# Patient Record
Sex: MALE | Race: WHITE | NOT HISPANIC OR LATINO | Employment: OTHER | URBAN - METROPOLITAN AREA
[De-identification: names, ages, dates, MRNs, and addresses within clinical notes are randomized per-mention and may not be internally consistent; named-entity substitution may affect disease eponyms.]

---

## 2019-07-30 ENCOUNTER — HOSPITAL ENCOUNTER (OUTPATIENT)
Dept: RADIOLOGY | Facility: HOSPITAL | Age: 79
Discharge: HOME | End: 2019-07-30
Attending: INTERNAL MEDICINE
Payer: MEDICARE

## 2019-07-30 DIAGNOSIS — M54.12 CERVICAL RADICULITIS: Primary | ICD-10-CM

## 2019-07-30 DIAGNOSIS — M54.12 CERVICAL RADICULITIS: ICD-10-CM

## 2019-07-30 PROCEDURE — 72050 XR CERVICAL SPINE COMPLETE 5 VIEW: ICD-10-PCS | Mod: 26,,, | Performed by: RADIOLOGY

## 2019-07-30 PROCEDURE — 72050 X-RAY EXAM NECK SPINE 4/5VWS: CPT | Mod: 26,,, | Performed by: RADIOLOGY

## 2019-07-30 PROCEDURE — 72050 X-RAY EXAM NECK SPINE 4/5VWS: CPT | Mod: TC,FY

## 2019-09-04 DIAGNOSIS — M25.559 HIP PAIN: Primary | ICD-10-CM

## 2019-09-05 ENCOUNTER — HOSPITAL ENCOUNTER (OUTPATIENT)
Dept: RADIOLOGY | Facility: HOSPITAL | Age: 79
Discharge: HOME | End: 2019-09-05
Attending: PAIN MEDICINE
Payer: MEDICARE

## 2019-09-05 DIAGNOSIS — M25.559 HIP PAIN: ICD-10-CM

## 2019-09-05 PROCEDURE — 73521 X-RAY EXAM HIPS BI 2 VIEWS: CPT | Mod: 26,,, | Performed by: RADIOLOGY

## 2019-09-05 PROCEDURE — 73521 X-RAY EXAM HIPS BI 2 VIEWS: CPT | Mod: TC,FY

## 2019-09-05 PROCEDURE — 73521 XR HIPS BILATERAL 2 VIEW INCL AP PELVIS: ICD-10-PCS | Mod: 26,,, | Performed by: RADIOLOGY

## 2019-09-19 DIAGNOSIS — M54.12 CERVICAL RADICULOPATHY: Primary | ICD-10-CM

## 2019-10-14 ENCOUNTER — CLINICAL SUPPORT (OUTPATIENT)
Dept: REHABILITATION | Facility: HOSPITAL | Age: 79
End: 2019-10-14
Attending: INTERNAL MEDICINE
Payer: MEDICARE

## 2019-10-14 DIAGNOSIS — M54.12 CERVICAL RADICULOPATHY: ICD-10-CM

## 2019-10-14 DIAGNOSIS — M54.2 CERVICAL PAIN: ICD-10-CM

## 2019-10-14 DIAGNOSIS — R53.1 WEAKNESS: ICD-10-CM

## 2019-10-14 PROCEDURE — 97140 MANUAL THERAPY 1/> REGIONS: CPT | Mod: PN

## 2019-10-14 PROCEDURE — 97162 PT EVAL MOD COMPLEX 30 MIN: CPT | Mod: PN

## 2019-10-14 NOTE — PLAN OF CARE
[facility]  Physical Therapy Initial Evaluation    Name: Cesar Eubanks  Clinic Number: [id number]    Therapy Diagnosis:   Encounter Diagnoses   Name Primary?    Cervical pain     Weakness     Cervical radiculopathy      Physician: Alfa Chase MD    Physician Orders: PT Eval and Treat   Medical Diagnosis: M54.12 (ICD-10-CM) - Cervical radiculopathy  Date of Surgery: NA    Evaluation Date: 10/14/2019  Authorization Period Expiration: 11/11/2019  Plan of Care Certification Period: 11/30/2019  Visit # / Visits authorized: 1/ 12    Time In: 1000  Time Out: 1045  Total Billable Time: 45 minutes    Precautions: HTN    Subjective   Date of onset: 6 months  History of current condition - Cesar reports to PT with RUE pins/needles and itching. Notes pain/discomfort began approximately 6 months prior without any specific BELKIS. No prior hx of these symptoms. X ray shows anterolisthesis at C4-C5 & C5-C6 levels. Pain in the neck is minimal at this time.       Past Medical History:   Diagnosis Date    Arthritis     Hernia 6/2013    Hypertension     Polymyalgia rheumatica      Cesar Eubanks  has a past surgical history that includes Colonoscopy; Pilonidal cyst drainage; laparoscopic cholecystectomy (10/17/12); Cholecystectomy (10/17/12); Appendectomy; Tonsillectomy; and Hernia repair (07/11/2013).    Cesar has a current medication list which includes the following prescription(s): aspirin, cetirizine, diclofenac, lisinopril-hydrochlorothiazide, nifedical xl, tramadol, and zolpidem.    Review of patient's allergies indicates:   Allergen Reactions    Penicillins         Imaging, X Ray: No evidence for acute fracture, dislocation or destructive process.  There is grade 1 anterolisthesis of C4 on C5 and of C5 on C6.  Vertebral body heights are maintained.  There is diffuse disc space narrowing.  The facet joints demonstrate degenerative changes but are well aligned.  There is  uncovertebral spurring bilaterally.  The dense and atlanto odontoid interval are maintained.  No prevertebral soft tissue swelling.  Skullbase, soft tissues of the neck, lung apices are unremarkable.    Prior Therapy: Hips and Knees  Social History: Cares for spouse with dementia/Alzheimers   Occupation: Retired  Prior Level of Function: Independent  Current Level of Function: Constant itching and difficulty with repeated activity    Pain:  Current 1/10, worst 5/10, best 0/10   Location: right back , elbow  and shoulder   Description: Aching, Tingling and Itching  Aggravating Factors: Laying and Lifting  Easing Factors: CBD Oil    Pts goals: Decrease discomfort with daily activity    Objective     Observation: Pt enters independently    Posture: Rounded shoulder/Forward Head    Cervical Range of Motion:    Degrees Pain   Flexion WNL      Extension 50 -     Right Rotation 40      Left Rotation 40      Right Side Bending 30    Left Side Bending 25 +      Shoulder Range of Motion:   Shoulder Left Right   Flexion 180 170   Abduction 180 160   ER 90 75   IR T12 L4       Upper Extremity Strength  (R) UE  (L) UE    Shoulder flexion: 4/5 Shoulder flexion: 5/5   Shoulder Abduction: 4/5 Shoulder abduction: 5/5   Shoulder ER 4+/5 Shoulder ER 5/5   Shoulder IR 4+/5 Shoulder IR 5/5   Elbow flexion: 4-/5 Elbow flexion: 5/5   Elbow extension: 4-/5 Elbow extension: 5/5   Wrist flexion: 5/5 Wrist flexion: 5/5   Wrist extension: 5/5 Wrist extension: 5/5         Special Tests:  Distraction NC   Compression NC   Ulnar Stress Pos   VA test Neg   Lateral Flexion Alar Ligament Neg   DNF test Neg       TOS tests:   - Reproduction of concordant signs with palpation of anterior/middle scalenes, 1st rib, and pectoralis minor msculature    Palpation: As noted above      Sensation: Impaired along C5 distribution          CMS Impairment/Limitation/Restriction for FOTO Cervical Survey    Therapist reviewed FOTO scores for Cesar Eubanks  on 10/14/2019.   FOTO documents entered into [employer] - see Media section.    Limitation Score: 41%  Category: Body Position    Current : CK = at least 40% but < 60% impaired, limited or restricted  Goal: CJ = at least 20% but < 40% impaired, limited or restricted           TREATMENT   Treatment Time In: 1030  Treatment Time Out: 1045  Total Treatment time separate from Evaluation time:15    Cesar received therapeutic exercises to develop strength, ROM and posture for 5 minutes including:  - HEP Review  - Pec stretch  - Ulnar nerve glide  - Cervical retraction  - Scalene release      Cesar received the following manual therapy techniques: Joint mobilizations, Manual traction, Myofacial release and Soft tissue Mobilization were applied to the: Neck/Shoulder for 10 minutes, including:  - Scalene Release (Strain/Counterstrain)  - Pec Minor release/stretch  - Ulnar nerve glides      Home Exercises and Patient Education Provided    Education provided re: HEP, Dx, POC    Written Home Exercises Provided: .  Exercises were reviewed and Cesar was able to demonstrate them prior to the end of the session.   Pt received a written copy of exercises to perform at home. Cesar demonstrated good  understanding of the education provided.     See EMR under patient instructions for exercises given.   Assessment   Cesar is a 79 y.o. male referred to outpatient Physical Therapy with a medical diagnosis of cervical radiculopathy with imaging showing an anterolisthesis at C4/5 and C5/6. Pt presents with s/sx consistent with neural compression located along 2 points commonly associated with TOS. Primary impairments at this time include strength, ROM, joint mobility, neural glide restrictions, and pain that limits pt tolerance to ADL's. This pt had a positive response after the first tx session noting a resolution of pain and abnormal sensation post scalene and pec minor releases. Good prognosis moving forward with PT to focus on postural  education, pt change of sleeping habits, and soft tissue release for neural decompression.    Pt prognosis is Good.   Pt will benefit from skilled outpatient Physical Therapy to address the deficits stated above and in the chart below, provide pt/family education, and to maximize pt's level of independence.     Plan of care discussed with patient: Yes  Pt's spiritual, cultural and educational needs considered and patient is agreeable to the plan of care and goals as stated below:     Anticipated Barriers for therapy: Age    Medical Necessity is demonstrated by the following  History  Co-morbidities and personal factors that may impact the plan of care Co-morbidities:   See Above    Personal Factors:   age     moderate   Examination  Body Structures and Functions, activity limitations and participation restrictions that may impact the plan of care Body Regions:   neck  upper extremities    Body Systems:    gross symmetry  ROM  strength    Participation Restrictions:   Lifting, Carrying, Pushing, Pulling, Turning head while driving    Activity limitations:   Learning and applying knowledge  no deficits    Mobility  lifting and carrying objects    Self care  dressing    Domestic Life  doing house work (cleaning house, washing dishes, laundry)    Life Areas  no deficits    Community and Social Life  no deficits         moderate   Clinical Presentation evolving clinical presentation with changing clinical characteristics moderate   Decision Making/ Complexity Score: moderate     Goals:    Short Term Goals (4 Weeks):  - Pt will increase cervical ROM to >50% of WNL in all planes for improved tolerance with driving activity  - Pt will increase RUE strength to > 4/5 for ease with ADL's  - Decrease pain/sensation symptoms by 50% for improved tolerance to ADL's    - Pt independent with HEP to improve tolerance to exercise progressions.     Long Term Goals (6 Weeks):  - Pt will increase cervical ROM to 75% of WNL and RUE to  match the contralateral side for return to prior activity level  - Pt will increase RUE strength to match the contralateral side  - Decrease symptoms >75% for return to prior activity level   - Pt will report improvement in overall functional abilities, evidenced by improved score on  FOTO to 40% limitation or better.         Plan   Certification Period/Plan of care expiration: 10/14/2019 to 11/30/2019.    Outpatient Physical Therapy 2 times weekly for 8 weeks to include the following interventions: Manual Therapy, Moist Heat/ Ice, Neuromuscular Re-ed, Patient Education, Therapeutic Activites and Therapeutic Exercise.     Jaxon Lockett, PT, DPT, OCS

## 2019-10-17 ENCOUNTER — CLINICAL SUPPORT (OUTPATIENT)
Dept: REHABILITATION | Facility: HOSPITAL | Age: 79
End: 2019-10-17
Attending: INTERNAL MEDICINE
Payer: MEDICARE

## 2019-10-17 DIAGNOSIS — M54.2 CERVICAL PAIN: ICD-10-CM

## 2019-10-17 DIAGNOSIS — R53.1 WEAKNESS: ICD-10-CM

## 2019-10-17 DIAGNOSIS — M54.12 CERVICAL RADICULOPATHY: ICD-10-CM

## 2019-10-17 PROCEDURE — 97140 MANUAL THERAPY 1/> REGIONS: CPT | Mod: PN

## 2019-10-17 PROCEDURE — 97110 THERAPEUTIC EXERCISES: CPT | Mod: PN

## 2019-10-17 NOTE — PROGRESS NOTES
"  Physical Therapy Daily Treatment Note     Name: Cesar Eubanks  Clinic Number: [id number]    Therapy Diagnosis:   Encounter Diagnoses   Name Primary?    Cervical pain     Weakness     Cervical radiculopathy      Physician: Alfa Chase MD    Visit Date: 10/17/2019  Physician Orders: PT Eval and Treat   Medical Diagnosis: M54.12 (ICD-10-CM) - Cervical radiculopathy  Date of Surgery: NA     Evaluation Date: 10/14/2019  Authorization Period Expiration: 11/11/2019  Plan of Care Certification Period: 11/30/2019  Visit # / Visits authorized: 2/ 12       Time In: 1700  Time Out: 1755  Total Billable Time: 55 minutes    Precautions: HTN    Subjective     Pt reports: no new c/o  He was compliant with home exercise program.  Response to previous treatment: tolerated well  Functional change: Ongoing    Pain: 0/10  Location: left UE and neck. "Pins and needles      Objective     Cesar received therapeutic exercises to develop strength, endurance, ROM, flexibility, posture and core stabilization for 30 minutes including:    Supine:   - Serratus press up  2x10  - Pec stretch  - Ulnar nerve glide  - Cervical retraction  - Scalene release       Cesar received the following manual therapy techniques: Joint mobilizations, Myofacial release, Soft tissue Mobilization and Friction Massage were applied to the: right upper quadrant  For 25  minutes, including:    - Scalene Release (Strain/Counterstrain)  - Pec Minor release/stretch  - Ulnar nerve glides   - Pec Insertion CFM  - Upper trap static DT in side lying  - Scalene anchoring w/AROM rotation       Home Exercises Provided and Patient Education Provided     Education provided:   - HEP stretch and nerve glide technique    Written Home Exercises Provided: Patient instructed to cont prior HEP.  Exercises were reviewed and Cesar was able to demonstrate them prior to the end of the session.  Cesar demonstrated good  understanding of the education provided.     See EMR " under Patient Instructions for exercises provided prior visit.    Assessment   Tolerated first follow up well. Reviewed HEP. Added interventions in bold    Cesar is progressing well towards his goals.   Pt prognosis is Good.     Pt will continue to benefit from skilled outpatient physical therapy to address the deficits listed in the problem list box on initial evaluation, provide pt/family education and to maximize pt's level of independence in the home and community environment.     Pt's spiritual, cultural and educational needs considered and pt agreeable to plan of care and goals.     Anticipated barriers to physical therapy:  age    Goals:   Short Term Goals (4 Weeks):  - Pt will increase cervical ROM to >50% of WNL in all planes for improved tolerance with driving activity  - Pt will increase RUE strength to > 4/5 for ease with ADL's  - Decrease pain/sensation symptoms by 50% for improved tolerance to ADL's    - Pt independent with HEP to improve tolerance to exercise progressions.      Long Term Goals (6 Weeks):  - Pt will increase cervical ROM to 75% of WNL and RUE to match the contralateral side for return to prior activity level  - Pt will increase RUE strength to match the contralateral side  - Decrease symptoms >75% for return to prior activity level   - Pt will report improvement in overall functional abilities, evidenced by improved score on  FOTO to 40% limitation or better.           Plan     Continue PT POC     Gregorio Herrmann, ROSAS

## 2019-10-22 ENCOUNTER — CLINICAL SUPPORT (OUTPATIENT)
Dept: REHABILITATION | Facility: HOSPITAL | Age: 79
End: 2019-10-22
Attending: INTERNAL MEDICINE
Payer: MEDICARE

## 2019-10-22 DIAGNOSIS — M54.2 CERVICAL PAIN: ICD-10-CM

## 2019-10-22 DIAGNOSIS — R53.1 WEAKNESS: ICD-10-CM

## 2019-10-22 DIAGNOSIS — M54.12 CERVICAL RADICULOPATHY: ICD-10-CM

## 2019-10-22 PROCEDURE — 97110 THERAPEUTIC EXERCISES: CPT | Mod: PN

## 2019-10-22 PROCEDURE — 97140 MANUAL THERAPY 1/> REGIONS: CPT | Mod: PN

## 2019-10-22 NOTE — PROGRESS NOTES
"  Physical Therapy Daily Treatment Note     Name: Cesar Eubanks  Clinic Number: [id number]    Therapy Diagnosis:   Encounter Diagnoses   Name Primary?    Cervical pain     Weakness     Cervical radiculopathy      Physician: Alfa Chase MD    Visit Date: 10/22/2019  Physician Orders: PT Eval and Treat   Medical Diagnosis: M54.12 (ICD-10-CM) - Cervical radiculopathy  Date of Surgery: NA     Evaluation Date: 10/14/2019  Authorization Period Expiration: 11/11/2019  Plan of Care Certification Period: 11/30/2019  Visit # / Visits authorized: 3/ 12       Time In: 1700  Time Out: 1745  Total Billable Time: 25 minutes    Precautions: HTN    Subjective     Pt reports: still has intermittent episodes of "pins and needles" down right shoulder and arm.  None right now.  He was compliant with home exercise program.  Response to previous treatment: tolerated well  Functional change: Ongoing    Pain: 0/10  Location: left UE and neck. "Pins and needles"     Objective     Cesar received therapeutic exercises to develop strength, endurance, ROM, flexibility, posture and core stabilization for 25 minutes including:    Supine:   - Serratus press up  2x10  - Pec stretch  - Ulnar nerve glide  - Cervical retraction  - Scalene stretch  - Open book 1x10 R only       Cesar received the following manual therapy techniques: Joint mobilizations, Myofacial release, Soft tissue Mobilization and Friction Massage were applied to the: right upper quadrant for 15  minutes, including:    - Scalene Release   - Pec Minor release/stretch  - Ulnar nerve glides   - Pec Insertion CFM  - Upper trap static STM/MFR in side lying  - Scalene anchoring w/AROM rotation       Home Exercises Provided and Patient Education Provided     Education provided:   - HEP stretch and nerve glide technique    Written Home Exercises Provided: Patient instructed to cont prior HEP.  Exercises were reviewed and Cesar was able to demonstrate them prior to the end of " "the session.  Cesar demonstrated good  understanding of the education provided.     See EMR under Patient Instructions for exercises provided prior visit.    Assessment     Patient able to complete all therex with reports of "pins and needles" only with scalene stretch.  Symptoms resolved immediately when stopped.  States "good" after treatment.  No complaints of pain or radicular symptoms.    Cesar is progressing well towards his goals.   Pt prognosis is Good.     Pt will continue to benefit from skilled outpatient physical therapy to address the deficits listed in the problem list box on initial evaluation, provide pt/family education and to maximize pt's level of independence in the home and community environment.     Pt's spiritual, cultural and educational needs considered and pt agreeable to plan of care and goals.     Anticipated barriers to physical therapy:  age    Goals:   Short Term Goals (4 Weeks):  - Pt will increase cervical ROM to >50% of WNL in all planes for improved tolerance with driving activity  - Pt will increase RUE strength to > 4/5 for ease with ADL's  - Decrease pain/sensation symptoms by 50% for improved tolerance to ADL's    - Pt independent with HEP to improve tolerance to exercise progressions.      Long Term Goals (6 Weeks):  - Pt will increase cervical ROM to 75% of WNL and RUE to match the contralateral side for return to prior activity level  - Pt will increase RUE strength to match the contralateral side  - Decrease symptoms >75% for return to prior activity level   - Pt will report improvement in overall functional abilities, evidenced by improved score on  FOTO to 40% limitation or better.           Plan     Continue PT POC     Sandee Curry PTA   "

## 2019-10-29 ENCOUNTER — CLINICAL SUPPORT (OUTPATIENT)
Dept: REHABILITATION | Facility: HOSPITAL | Age: 79
End: 2019-10-29
Attending: INTERNAL MEDICINE
Payer: MEDICARE

## 2019-10-29 DIAGNOSIS — M54.2 CERVICAL PAIN: ICD-10-CM

## 2019-10-29 DIAGNOSIS — M54.12 CERVICAL RADICULOPATHY: ICD-10-CM

## 2019-10-29 DIAGNOSIS — R53.1 WEAKNESS: ICD-10-CM

## 2019-10-29 PROCEDURE — 97110 THERAPEUTIC EXERCISES: CPT | Mod: PN

## 2019-10-29 PROCEDURE — 97140 MANUAL THERAPY 1/> REGIONS: CPT | Mod: PN

## 2019-10-29 NOTE — PROGRESS NOTES
"  Physical Therapy Daily Treatment Note     Name: Cesar Eubanks  Clinic Number: [id number]    Therapy Diagnosis:   Encounter Diagnoses   Name Primary?    Cervical pain     Weakness     Cervical radiculopathy      Physician: Alfa Chase MD    Visit Date: 10/29/2019  Physician Orders: PT Eval and Treat   Medical Diagnosis: M54.12 (ICD-10-CM) - Cervical radiculopathy  Date of Surgery: NA     Evaluation Date: 10/14/2019  Authorization Period Expiration: 11/11/2019  Plan of Care Certification Period: 11/30/2019  Visit # / Visits authorized: 4/ 12    Visit amt:      57.78  Total Amt.: 421.69       Time In: 1700  Time Out: 1745  Total Billable Time: 25 minutes    Precautions: HTN    Subjective     Pt reports: still has intermittent episodes of "pins and needles" down right shoulder and arm.  None from last Tuesday until Saturday.  "whatever you did last time was great."  Has had numbness in fifth digit and medial hand since then.  He was compliant with home exercise program.  Response to previous treatment: tolerated well  Functional change: Ongoing    Pain: 0/10  Location: left UE and neck. "Pins and needles"     Objective     Cesar received therapeutic exercises to develop strength, endurance, ROM, flexibility, posture and core stabilization for 25 minutes including:    Supine:   - Serratus press up  2x10 with dowel  - Pec stretch   - Ulnar nerve glide 2x10  - Cervical retraction 1x10  - Scalene stretch 20"x3 B  - Open book 1x10 R only  - Wall slides 1x10  - No money 1x10       Cesar received the following manual therapy techniques: Joint mobilizations, Myofacial release, Soft tissue Mobilization and Friction Massage were applied to the: right upper quadrant for 15  minutes, including:    - Scalene Release   - Pec Minor release/stretch  - Ulnar nerve glides   - Pec Insertion CFM  - Upper trap static STM/MFR in side lying  - Scalene anchoring w/AROM rotation       Home Exercises Provided and Patient " "Education Provided     Education provided:   - HEP stretch and nerve glide technique    Written Home Exercises Provided: Patient instructed to cont prior HEP.  Exercises were reviewed and Cesar was able to demonstrate them prior to the end of the session.  Cesar demonstrated good  understanding of the education provided.     See EMR under Patient Instructions for exercises provided prior visit.    Assessment     Patient able to complete all therex with reports of "pins and needles" only with scalene stretch.  Symptoms resolved immediately when stopped.  States "good" after treatment.  No complaints of pain or radicular symptoms.    Cesar is progressing well towards his goals.   Pt prognosis is Good.     Pt will continue to benefit from skilled outpatient physical therapy to address the deficits listed in the problem list box on initial evaluation, provide pt/family education and to maximize pt's level of independence in the home and community environment.     Pt's spiritual, cultural and educational needs considered and pt agreeable to plan of care and goals.     Anticipated barriers to physical therapy:  age    Goals:   Short Term Goals (4 Weeks):  - Pt will increase cervical ROM to >50% of WNL in all planes for improved tolerance with driving activity  - Pt will increase RUE strength to > 4/5 for ease with ADL's  - Decrease pain/sensation symptoms by 50% for improved tolerance to ADL's    - Pt independent with HEP to improve tolerance to exercise progressions.      Long Term Goals (6 Weeks):  - Pt will increase cervical ROM to 75% of WNL and RUE to match the contralateral side for return to prior activity level  - Pt will increase RUE strength to match the contralateral side  - Decrease symptoms >75% for return to prior activity level   - Pt will report improvement in overall functional abilities, evidenced by improved score on  FOTO to 40% limitation or better.           Plan     Continue PT POC     Sandee " Antoinette, PTA

## 2019-10-31 ENCOUNTER — CLINICAL SUPPORT (OUTPATIENT)
Dept: REHABILITATION | Facility: HOSPITAL | Age: 79
End: 2019-10-31
Attending: INTERNAL MEDICINE
Payer: MEDICARE

## 2019-10-31 DIAGNOSIS — M54.2 CERVICAL PAIN: ICD-10-CM

## 2019-10-31 DIAGNOSIS — M54.12 CERVICAL RADICULOPATHY: ICD-10-CM

## 2019-10-31 DIAGNOSIS — R53.1 WEAKNESS: ICD-10-CM

## 2019-10-31 PROCEDURE — 97110 THERAPEUTIC EXERCISES: CPT | Mod: PN

## 2019-10-31 PROCEDURE — 97140 MANUAL THERAPY 1/> REGIONS: CPT | Mod: PN

## 2019-10-31 NOTE — PROGRESS NOTES
"  Physical Therapy Daily Treatment Note     Name: Cesar Eubanks  Clinic Number: [id number]    Therapy Diagnosis:   Encounter Diagnoses   Name Primary?    Cervical pain     Weakness     Cervical radiculopathy      Physician: Alfa Chase MD    Visit Date: 10/31/2019  Physician Orders: PT Eval and Treat   Medical Diagnosis: M54.12 (ICD-10-CM) - Cervical radiculopathy  Date of Surgery: NA     Evaluation Date: 10/14/2019  Authorization Period Expiration: 11/11/2019  Plan of Care Certification Period: 11/30/2019  Visit # / Visits authorized: 4/ 12    Visit amt:      57.78  Total Amt.: 421.69       Time In: 1600  Time Out: 1645  Total Billable Time: 45 minutes (2TE 1MT)     Precautions: HTN    Subjective     Pt reports: still has intermittent episodes of "pins and needles" down right shoulder and arm.  No pain or soreness today. Feels good.   He was compliant with home exercise program.  Response to previous treatment: tolerated well  Functional change: Ongoing    Pain: 0/10  Location: left UE and neck. "Pins and needles"     Objective     Cesar received therapeutic exercises to develop strength, endurance, ROM, flexibility, posture and core stabilization for 30 minutes including:    Supine:   - Serratus press up  2x10 with 4# dowel  - Pec stretch   - Ulnar nerve glide 3x10  - Cervical retraction 3x10, 5"  - Scalene stretch 20"x3 B  - Open book 3x10, 3" R only - held due to pin's and needles in R shoulder   - Wall slides 3x10  - No money 3x10, 5"  -Trunk Rotation standing using RTB 2x10        Cesar received the following manual therapy techniques: Joint mobilizations, Myofacial release, Soft tissue Mobilization and Friction Massage were applied to the: right upper quadrant for 15  minutes, including:    - Scalene Release -DNP  - Pec Minor release/stretch -DNP  - Ulnar nerve glides   - Pec Insertion CFM  - Upper trap static STM/MFR in side lying-DNP  - Scalene anchoring w/AROM rotation  -Manual Traction " "       Home Exercises Provided and Patient Education Provided     Education provided:   - HEP stretch and nerve glide technique    Written Home Exercises Provided: Patient instructed to cont prior HEP.  Exercises were reviewed and Cesar was able to demonstrate them prior to the end of the session.  Cesar demonstrated good  understanding of the education provided.     See EMR under Patient Instructions for exercises provided prior visit.    Assessment     Patient able to complete all therex with reports of "pins and needles" only with scalene stretch.  Symptoms resolved immediately when stopped.  Pt able to tolerate all other exercises. Pt also had "pins and needles" for open books and exercise was held and progressed to standing trunk rotation with RTB along with 4# dowel for serratus punch ups. Pt wanted to leave a little early today for halloween to prepare for trick-or-treaters.     Cesar is progressing well towards his goals.   Pt prognosis is Good.     Pt will continue to benefit from skilled outpatient physical therapy to address the deficits listed in the problem list box on initial evaluation, provide pt/family education and to maximize pt's level of independence in the home and community environment.     Pt's spiritual, cultural and educational needs considered and pt agreeable to plan of care and goals.     Anticipated barriers to physical therapy:  age    Goals:   Short Term Goals (4 Weeks):  - Pt will increase cervical ROM to >50% of WNL in all planes for improved tolerance with driving activity  - Pt will increase RUE strength to > 4/5 for ease with ADL's  - Decrease pain/sensation symptoms by 50% for improved tolerance to ADL's    - Pt independent with HEP to improve tolerance to exercise progressions.      Long Term Goals (6 Weeks):  - Pt will increase cervical ROM to 75% of WNL and RUE to match the contralateral side for return to prior activity level  - Pt will increase RUE strength to match the " contralateral side  - Decrease symptoms >75% for return to prior activity level   - Pt will report improvement in overall functional abilities, evidenced by improved score on  FOTO to 40% limitation or better.           Plan     Pt will continue POC as tolerated. Pt will progress with standing rows.     Car Betancourt, PT

## 2019-11-05 ENCOUNTER — CLINICAL SUPPORT (OUTPATIENT)
Dept: REHABILITATION | Facility: HOSPITAL | Age: 79
End: 2019-11-05
Attending: INTERNAL MEDICINE
Payer: MEDICARE

## 2019-11-05 DIAGNOSIS — M54.2 CERVICAL PAIN: ICD-10-CM

## 2019-11-05 DIAGNOSIS — R53.1 WEAKNESS: ICD-10-CM

## 2019-11-05 DIAGNOSIS — M54.12 CERVICAL RADICULOPATHY: ICD-10-CM

## 2019-11-05 PROCEDURE — 97140 MANUAL THERAPY 1/> REGIONS: CPT | Mod: PN

## 2019-11-05 PROCEDURE — 97110 THERAPEUTIC EXERCISES: CPT | Mod: PN

## 2019-11-05 NOTE — PROGRESS NOTES
"  Physical Therapy Daily Treatment Note     Name: Cesar Eubanks  Clinic Number: [id number]    Therapy Diagnosis:   Encounter Diagnoses   Name Primary?    Cervical pain     Weakness     Cervical radiculopathy      Physician: Alfa Chase MD    Visit Date: 11/5/2019  Physician Orders: PT Eval and Treat   Medical Diagnosis: M54.12 (ICD-10-CM) - Cervical radiculopathy  Date of Surgery: NA     Evaluation Date: 10/14/2019  Authorization Period Expiration: 11/11/2019  Plan of Care Certification Period: 11/30/2019  Visit # / Visits authorized: 6/ 12  FOTO:  6/10  NEXT  GCode: 6/10  Visit amt:      88.10  Total Amt.: 509.79       Time In: 1600  Time Out: 1645  Total Billable Time: 45 minutes (2TE 1MT)     Precautions: HTN    Subjective     Pt reports: still has intermittent episodes of "pins and needles" down right shoulder and arm.  No pain or soreness today. Feels good.   He was compliant with home exercise program.  Response to previous treatment: tolerated well  Functional change: Ongoing    Pain: 1-2/10  Location: left UE and neck. "Pins and needles"     Objective     Cesar received therapeutic exercises to develop strength, endurance, ROM, flexibility, posture and core stabilization for 30 minutes including:    Supine:   - Serratus press up  2x10 with 4# dowel  - Pec stretch   - Ulnar nerve glide 3x10  - Cervical retraction 3x10, 5"  - Scalene stretch 20"x3 B  - Open book 3x10, 3" R only - held due to pin's and needles in R shoulder   - Wall slides 3x10  - No money 3x10, 5"  -Trunk Rotation standing using RTB 2x10 out of time  - Standing rows:  NEXT    Cesar received the following manual therapy techniques: Joint mobilizations, Myofacial release, Soft tissue Mobilization and Friction Massage were applied to the: right upper quadrant for 15  minutes, including:    - STM B cervical paraspinals with elongation and sub occipital release.  - Scalene Release -DNP  - Pec Minor release/stretch   - Ulnar nerve " "glides   - Pec Insertion CFM  - Upper trap static STM/MFR in side lying  - Scalene anchoring w/AROM rotation      Home Exercises Provided and Patient Education Provided     Education provided:   - HEP stretch and nerve glide technique    Written Home Exercises Provided: Patient instructed to cont prior HEP.  Exercises were reviewed and Cesar was able to demonstrate them prior to the end of the session.  Cesar demonstrated good  understanding of the education provided.     See EMR under Patient Instructions for exercises provided 10/14/2019    Assessment     Patient able to complete all therex with reports of "pins and needles" only with open books.  Symptoms resolved immediately when stopped.  Pt able to tolerate all other exercises. Pt requests to leave a little early today    Cesar is progressing well towards his goals.   Pt prognosis is Good.     Pt will continue to benefit from skilled outpatient physical therapy to address the deficits listed in the problem list box on initial evaluation, provide pt/family education and to maximize pt's level of independence in the home and community environment.     Pt's spiritual, cultural and educational needs considered and pt agreeable to plan of care and goals.     Anticipated barriers to physical therapy:  age    Goals:   Short Term Goals (4 Weeks):  - Pt will increase cervical ROM to >50% of WNL in all planes for improved tolerance with driving activity   (PROGRESSING, NOT MET)  - Pt will increase RUE strength to > 4/5 for ease with ADL's   (PROGRESSING, NOT MET)  - Decrease pain/sensation symptoms by 50% for improved tolerance to ADL's   (PROGRESSING, NOT MET)  - Pt independent with HEP to improve tolerance to exercise progressions.  (PROGRESSING, NOT MET)       Long Term Goals (6 Weeks):  - Pt will increase cervical ROM to 75% of WNL and RUE to match the contralateral side for return to prior activity level   (PROGRESSING, NOT MET)  - Pt will increase RUE strength " to match the contralateral side.   (PROGRESSING, NOT MET)  - Decrease symptoms >75% for return to prior activity level    (PROGRESSING, NOT MET)  - Pt will report improvement in overall functional abilities, evidenced by improved score on  FOTO to 40% limitation or better.  (PROGRESSING, NOT MET)      Plan     Pt will continue POC as tolerated. Pt will progress with standing rows.     Sandee Curry, PTA

## 2019-11-07 ENCOUNTER — CLINICAL SUPPORT (OUTPATIENT)
Dept: REHABILITATION | Facility: HOSPITAL | Age: 79
End: 2019-11-07
Attending: INTERNAL MEDICINE
Payer: MEDICARE

## 2019-11-07 DIAGNOSIS — M54.12 CERVICAL RADICULOPATHY: ICD-10-CM

## 2019-11-07 DIAGNOSIS — M54.2 CERVICAL PAIN: ICD-10-CM

## 2019-11-07 DIAGNOSIS — R53.1 WEAKNESS: ICD-10-CM

## 2019-11-07 PROCEDURE — 97110 THERAPEUTIC EXERCISES: CPT | Mod: PN

## 2019-11-07 NOTE — PROGRESS NOTES
"  Physical Therapy Daily Treatment Note     Name: Cesar Eubanks  Clinic Number: [id number]    Therapy Diagnosis:   Encounter Diagnoses   Name Primary?    Cervical pain     Weakness     Cervical radiculopathy      Physician: Alfa Chase MD    Visit Date: 11/7/2019  Physician Orders: PT Eval and Treat   Medical Diagnosis: M54.12 (ICD-10-CM) - Cervical radiculopathy  Date of Surgery: NA     Evaluation Date: 10/14/2019  Authorization Period Expiration: 11/11/2019  Plan of Care Certification Period: 11/30/2019  Visit # / Visits authorized: 7/ 12  FOTO:  7/10  DONE  GCode: 7/10  Visit amt:      90.96  Total Amt.: 600.75       Time In: 1600  Time Out: 1645  Total Billable Time: 45 minutes (3TE)     Precautions: HTN    Subjective     Pt reports: itching in his R forearm that has been bothering him all day.   He was compliant with home exercise program.  Response to previous treatment: tolerated well  Functional change: Ongoing    Pain: 1-2/10  Location: left UE and neck. "Pins and needles"     Objective     Cesar received therapeutic exercises to develop strength, endurance, ROM, flexibility, posture and core stabilization for 45 minutes including:     Supine:   - Serratus press up  2x10 with 4# dowel  - Pec stretch 3x30"  - Ulnar nerve glide 3x10  - Cervical retraction 3x10, 5"  - Scalene stretch 20"x3 B  - Open book 3x10, 3" R only - held due to pin's and needles in R shoulder   - Wall slides 3x10  - No money 3x10, 5" YTBh  -Trunk Rotation standing using RTB 2x10 out of time  - Standing rows:  3x10 RTB    Cesar received the following manual therapy techniques: Joint mobilizations, Myofacial release, Soft tissue Mobilization and Friction Massage were applied to the: right upper quadrant for 0  minutes, including: Did not perform today    - STM B cervical paraspinals with elongation and sub occipital release.  - Scalene Release -DNP  - Pec Minor release/stretch   - Ulnar nerve glides   - Pec Insertion CFM  - " "Upper trap static STM/MFR in side lying  - Scalene anchoring w/AROM rotation      Home Exercises Provided and Patient Education Provided     Education provided:   - HEP stretch and nerve glide technique    Written Home Exercises Provided: Patient instructed to cont prior HEP.  Exercises were reviewed and Cesar was able to demonstrate them prior to the end of the session.  Cesar demonstrated good  understanding of the education provided.     See EMR under Patient Instructions for exercises provided 10/14/2019    Assessment     Patient able to complete all therex without any complaint of pins and needles. Pt needed min verbal and visual demonstration/cuing for open books. Pt could not complete third set as his shoulder started to bother him during open books. Pt progressed with standing rows RTB and "no money" with YTB. Pt asked to leave 15 min early.     Cesar is progressing well towards his goals.   Pt prognosis is Good.     Pt will continue to benefit from skilled outpatient physical therapy to address the deficits listed in the problem list box on initial evaluation, provide pt/family education and to maximize pt's level of independence in the home and community environment.     Pt's spiritual, cultural and educational needs considered and pt agreeable to plan of care and goals.     Anticipated barriers to physical therapy:  age    Goals:   Short Term Goals (4 Weeks):  - Pt will increase cervical ROM to >50% of WNL in all planes for improved tolerance with driving activity   (PROGRESSING, NOT MET)  - Pt will increase RUE strength to > 4/5 for ease with ADL's   (PROGRESSING, NOT MET)  - Decrease pain/sensation symptoms by 50% for improved tolerance to ADL's   (PROGRESSING, NOT MET)  - Pt independent with HEP to improve tolerance to exercise progressions.  (PROGRESSING, NOT MET)       Long Term Goals (6 Weeks):  - Pt will increase cervical ROM to 75% of WNL and RUE to match the contralateral side for return to " prior activity level   (PROGRESSING, NOT MET)  - Pt will increase RUE strength to match the contralateral side.   (PROGRESSING, NOT MET)  - Decrease symptoms >75% for return to prior activity level    (PROGRESSING, NOT MET)  - Pt will report improvement in overall functional abilities, evidenced by improved score on  FOTO to 40% limitation or better.  (PROGRESSING, NOT MET)      Plan     Pt will continue POC as tolerated. Pt will progress with standing lat pull downs    Car Betancourt, PT

## 2019-11-12 ENCOUNTER — CLINICAL SUPPORT (OUTPATIENT)
Dept: REHABILITATION | Facility: HOSPITAL | Age: 79
End: 2019-11-12
Attending: INTERNAL MEDICINE
Payer: MEDICARE

## 2019-11-12 DIAGNOSIS — M54.12 CERVICAL RADICULOPATHY: ICD-10-CM

## 2019-11-12 DIAGNOSIS — M54.2 CERVICAL PAIN: ICD-10-CM

## 2019-11-12 DIAGNOSIS — R53.1 WEAKNESS: ICD-10-CM

## 2019-11-12 PROCEDURE — 97140 MANUAL THERAPY 1/> REGIONS: CPT | Mod: PN

## 2019-11-12 PROCEDURE — 97110 THERAPEUTIC EXERCISES: CPT | Mod: PN

## 2019-11-12 NOTE — PROGRESS NOTES
"  Physical Therapy Daily Treatment Note     Name: Cesar Eubanks  Clinic Number: [id number]    Therapy Diagnosis:   Encounter Diagnoses   Name Primary?    Cervical pain     Weakness     Cervical radiculopathy      Physician: Alfa Chase MD    Visit Date: 11/12/2019  Physician Orders: PT Eval and Treat   Medical Diagnosis: M54.12 (ICD-10-CM) - Cervical radiculopathy  Date of Surgery: NA     Evaluation Date: 10/14/2019  Authorization Period Expiration: 12/11/2019  Plan of Care Certification Period: 11/30/2019  Visit # / Visits authorized: 8/ 12  FOTO:  8/10  DONE  GCode: 8/10  Visit amt:    118.42  Total Amt.: 719.17     Time In: 1600  Time Out: 1655  Total Billable Time: 55 minutes (3TE, MT x 1)     Precautions: HTN    Subjective     Pt reports: continued itching in his R forearm that has been bothering him all day. "Pins and needles comes and goes, but this itching just jose hangs there"  He was compliant with home exercise program.  Response to previous treatment: tolerated well  Functional change: Ongoing    Pain: 1-2/10  Location: left UE and neck. "Pins and needles"     Objective     Cesar received therapeutic exercises to develop strength, endurance, ROM, flexibility, posture and core stabilization for 45 minutes including:     Supine:   - Serratus press up  2x10 with 4# dowel  - Pec stretch 3x30"  - Ulnar nerve glide 3x10  - Cervical retraction 3x10, 5"  - Scalene stretch 20"x3 B  - Open book 3x10, 3" R only   - Wall slides 3x10  - No money 3x10, 5" YTB  -Trunk Rotation standing using RTB 2x10   - Standing rows:  2x15 RTB  - Standing lat pulls:  NEXT    Cesar received the following manual therapy techniques: Joint mobilizations, Myofacial release, Soft tissue Mobilization and Friction Massage were applied to the: right upper quadrant for 10 minutes, including:     - STM B cervical paraspinals with elongation and sub occipital release.  - Scalene Release -DNP  - Pec Minor release/stretch   - " "Ulnar nerve glides   - Scalene anchoring w/AROM rotation       Home Exercises Provided and Patient Education Provided     Education provided:   - HEP stretch and nerve glide technique    Written Home Exercises Provided: Patient instructed to cont prior HEP.  Exercises were reviewed and Cesar was able to demonstrate them prior to the end of the session.  Cesar demonstrated good  understanding of the education provided.     See EMR under Patient Instructions for exercises provided 10/14/2019    Assessment     Patient able to complete all therex without any complaint of pins and needles. Pt needed min verbal and visual demonstration/cuing for open books. Pt able to increase reps as noted.  States "no pain now - itching gone too" after treatment.    Cesar is progressing well towards his goals.   Pt prognosis is Good.     Pt will continue to benefit from skilled outpatient physical therapy to address the deficits listed in the problem list box on initial evaluation, provide pt/family education and to maximize pt's level of independence in the home and community environment.     Pt's spiritual, cultural and educational needs considered and pt agreeable to plan of care and goals.     Anticipated barriers to physical therapy:  age    Goals:   Short Term Goals (4 Weeks):  - Pt will increase cervical ROM to >50% of WNL in all planes for improved tolerance with driving activity   (PROGRESSING, NOT MET)  - Pt will increase RUE strength to > 4/5 for ease with ADL's   (PROGRESSING, NOT MET)  - Decrease pain/sensation symptoms by 50% for improved tolerance to ADL's   (PROGRESSING, NOT MET)  - Pt independent with HEP to improve tolerance to exercise progressions.  (PROGRESSING, NOT MET)       Long Term Goals (6 Weeks):  - Pt will increase cervical ROM to 75% of WNL and RUE to match the contralateral side for return to prior activity level   (PROGRESSING, NOT MET)  - Pt will increase RUE strength to match the contralateral side.  "  (PROGRESSING, NOT MET)  - Decrease symptoms >75% for return to prior activity level    (PROGRESSING, NOT MET)  - Pt will report improvement in overall functional abilities, evidenced by improved score on  FOTO to 40% limitation or better.  (PROGRESSING, NOT MET)      Plan     Pt will continue POC as tolerated. Pt will progress with standing lat pull downs next visit if able.    Sandee Curry, PTA

## 2019-11-14 ENCOUNTER — CLINICAL SUPPORT (OUTPATIENT)
Dept: REHABILITATION | Facility: HOSPITAL | Age: 79
End: 2019-11-14
Attending: INTERNAL MEDICINE
Payer: MEDICARE

## 2019-11-14 DIAGNOSIS — R53.1 WEAKNESS: ICD-10-CM

## 2019-11-14 DIAGNOSIS — M54.12 CERVICAL RADICULOPATHY: ICD-10-CM

## 2019-11-14 DIAGNOSIS — M54.2 CERVICAL PAIN: ICD-10-CM

## 2019-11-14 PROCEDURE — 97140 MANUAL THERAPY 1/> REGIONS: CPT | Mod: PN

## 2019-11-14 PROCEDURE — 97110 THERAPEUTIC EXERCISES: CPT | Mod: PN

## 2019-11-14 NOTE — PROGRESS NOTES
"  Physical Therapy Daily Treatment Note     Name: Cesar Kc  Clinic Number: [id number]    Therapy Diagnosis:   Encounter Diagnoses   Name Primary?    Cervical pain     Weakness     Cervical radiculopathy      Physician: Alfa Chase MD    Visit Date: 11/14/2019  Physician Orders: PT Eval and Treat   Medical Diagnosis: M54.12 (ICD-10-CM) - Cervical radiculopathy  Date of Surgery: NA     Evaluation Date: 10/14/2019  Authorization Period Expiration: 12/11/2019  Plan of Care Certification Period: 11/30/2019  Visit # / Visits authorized: 9/ 12  FOTO:  9/10  DONE  GCode: 9/10  Visit amt:    118.42  Total Amt.: 719.17     Time In: 1600  Time Out: 1655  Total Billable Time: 55 minutes (3TE, MT x 1)     Precautions: HTN    Subjective     Pt reports: "Itching and Pins and needles comes and goes, but seems to be less often and having less issues at night"  He was compliant with home exercise program.  Response to previous treatment: tolerated well  Functional change: Less issues with itching, pins and needles.    Pain: 1-2/10  Location: left UE and neck.     Objective     Cesar received therapeutic exercises to develop strength, endurance, ROM, flexibility, posture and core stabilization for 45 minutes including:     Supine:   - Serratus press up  2x10 with 4# dowel  - Pec stretch 3x30"  - Ulnar nerve glide 3x10  - Cervical retraction 3x10, 5" supine  - Scalene stretch 30"x3 B   - Open book 3x10, 3" R only   - Cervical retraction seated:  5" hold, 2x10  - Wall slides 3x10  - No money 2x10, 5" RTB  -Trunk Rotation standing using RTB 2x10   - Standing rows:  2x15 RTB  - Standing lat pulls:  3x10 RTB    Cesar received the following manual therapy techniques: Joint mobilizations, Myofacial release, Soft tissue Mobilization and Friction Massage were applied to the: right upper quadrant for 10 minutes, including:     - STM B cervical paraspinals with elongation and sub occipital release.  - Scalene Release -DNP  - " "Pec Minor release/stretch   - Ulnar nerve glides   - Scalene anchoring w/AROM rotation       Home Exercises Provided and Patient Education Provided     Education provided:    - HEP stretch and nerve glide technique  - Given RTB and instructed in use for HEP    Written Home Exercises Provided: Patient instructed to cont prior HEP.  Exercises were reviewed and Cesar was able to demonstrate them prior to the end of the session.  Cesar demonstrated good  understanding of the education provided.     See EMR under Patient Instructions for exercises provided 10/14/2019    Assessment     Patient able to complete all therex including added activity and resistance as noted without any complaint of pins and needles or itching today. Pt able to increase reps as noted.  States "no pain now after treatment.    Cesar is progressing well towards his goals.   Pt prognosis is Good.     Pt will continue to benefit from skilled outpatient physical therapy to address the deficits listed in the problem list box on initial evaluation, provide pt/family education and to maximize pt's level of independence in the home and community environment.     Pt's spiritual, cultural and educational needs considered and pt agreeable to plan of care and goals.     Anticipated barriers to physical therapy:  age    Goals:   Short Term Goals (4 Weeks):  - Pt will increase cervical ROM to >50% of WNL in all planes for improved tolerance with driving activity   (PROGRESSING, NOT MET)  - Pt will increase RUE strength to > 4/5 for ease with ADL's   (PROGRESSING, NOT MET)  - Decrease pain/sensation symptoms by 50% for improved tolerance to ADL's   (PROGRESSING, NOT MET)  - Pt independent with HEP to improve tolerance to exercise progressions.  (PROGRESSING, NOT MET)       Long Term Goals (6 Weeks):  - Pt will increase cervical ROM to 75% of WNL and RUE to match the contralateral side for return to prior activity level   (PROGRESSING, NOT MET)  - Pt will " increase RUE strength to match the contralateral side.   (PROGRESSING, NOT MET)  - Decrease symptoms >75% for return to prior activity level    (PROGRESSING, NOT MET)  - Pt will report improvement in overall functional abilities, evidenced by improved score on  FOTO to 40% limitation or better.  (PROGRESSING, NOT MET)      Plan     Pt will continue POC as tolerated. Begin discharge planning.    Sandee Curry, PTA

## 2019-11-19 ENCOUNTER — CLINICAL SUPPORT (OUTPATIENT)
Dept: REHABILITATION | Facility: HOSPITAL | Age: 79
End: 2019-11-19
Attending: INTERNAL MEDICINE
Payer: MEDICARE

## 2019-11-19 DIAGNOSIS — R53.1 WEAKNESS: ICD-10-CM

## 2019-11-19 DIAGNOSIS — M54.12 CERVICAL RADICULOPATHY: ICD-10-CM

## 2019-11-19 DIAGNOSIS — M54.2 CERVICAL PAIN: ICD-10-CM

## 2019-11-19 PROCEDURE — 97110 THERAPEUTIC EXERCISES: CPT | Mod: PN

## 2019-11-19 PROCEDURE — 97140 MANUAL THERAPY 1/> REGIONS: CPT | Mod: PN

## 2019-11-19 NOTE — PROGRESS NOTES
"  Physical Therapy Daily Treatment Note     Name: Cesar Eubanks  Clinic Number: [id number]    Therapy Diagnosis:   Encounter Diagnoses   Name Primary?    Cervical pain     Weakness     Cervical radiculopathy      Physician: Alfa Chase MD    Visit Date: 11/19/2019  Physician Orders: PT Eval and Treat   Medical Diagnosis: M54.12 (ICD-10-CM) - Cervical radiculopathy  Date of Surgery: NA     Evaluation Date: 10/14/2019  Authorization Period Expiration: 12/11/2019  Plan of Care Certification Period: 11/30/2019  Visit # / Visits authorized: 10/ 12  FOTO:  10/10    GCode: 10/10  Visit amt:    118.42  Total Amt.: 837.59     Time In: 1600  Time Out: 1655  Total Billable Time: 55 minutes (3TE, MT x 1)     Precautions: HTN    Subjective     Pt reports: "Itching and Pins and needles comes and goes, but seems to be less often and having less issues at night"  He was compliant with home exercise program.  Response to previous treatment: tolerated well  Functional change: Less issues with itching, pins and needles.    Pain: 1-2/10  Location: left UE and neck.     Objective     Cesar received therapeutic exercises to develop strength, endurance, ROM, flexibility, posture and core stabilization for 45 minutes including:     Supine:   - Serratus press up  2x10 with 4# dowel  - Pec stretch 3x30"  - Ulnar nerve glide 3x10  - Cervical retraction 3x10, 5" supine  - Scalene stretch 30"x3 B   - Open book 3x10, 3" R only   - Cervical retraction seated:  5" hold, 2x10  - Wall slides 3x10  - No money 2x12, 5" RTB  -Trunk Rotation standing using RTB 2x12   - Standing rows:  2x15 RTB  - Standing lat pulls:  2x15 RTB    Cesar received the following manual therapy techniques: Joint mobilizations, Myofacial release, Soft tissue Mobilization and Friction Massage were applied to the: right upper quadrant for 10 minutes, including:     - STM B cervical paraspinals with elongation and sub occipital release.  - Pec Minor " "release/stretch   - Ulnar nerve glides   - Scalene anchoring w/AROM rotation       Home Exercises Provided and Patient Education Provided     Education provided:    - HEP stretch and nerve glide technique  - Given RTB and instructed in use for HEP    Written Home Exercises Provided: Patient instructed to cont prior HEP.  Exercises were reviewed and Cesar was able to demonstrate them prior to the end of the session.  Cesar demonstrated good  understanding of the education provided.     See EMR under Patient Instructions for exercises provided 10/14/2019    Assessment     Patient able to complete all therex today without any complaint of pins and needles or itching during treatment. Pt able to increase reps as noted.  States "no pain now" after treatment.    Cesar is progressing well towards his goals.   Pt prognosis is Good.     Pt will continue to benefit from skilled outpatient physical therapy to address the deficits listed in the problem list box on initial evaluation, provide pt/family education and to maximize pt's level of independence in the home and community environment.     Pt's spiritual, cultural and educational needs considered and pt agreeable to plan of care and goals.     Anticipated barriers to physical therapy:  age    Goals:   Short Term Goals (4 Weeks):  - Pt will increase cervical ROM to >50% of WNL in all planes for improved tolerance with driving activity   (PROGRESSING, NOT MET)  - Pt will increase RUE strength to > 4/5 for ease with ADL's   (PROGRESSING, NOT MET)  - Decrease pain/sensation symptoms by 50% for improved tolerance to ADL's   (PROGRESSING, NOT MET)  - Pt independent with HEP to improve tolerance to exercise progressions.  (PROGRESSING, NOT MET)       Long Term Goals (6 Weeks):  - Pt will increase cervical ROM to 75% of WNL and RUE to match the contralateral side for return to prior activity level   (PROGRESSING, NOT MET)  - Pt will increase RUE strength to match the " contralateral side.   (PROGRESSING, NOT MET)  - Decrease symptoms >75% for return to prior activity level    (PROGRESSING, NOT MET)  - Pt will report improvement in overall functional abilities, evidenced by improved score on  FOTO to 40% limitation or better.  (PROGRESSING, NOT MET)      Plan     Pt will continue POC as tolerated.  Prepare for possible discharge next week.    Sandee Curry, PTA

## 2019-11-21 ENCOUNTER — CLINICAL SUPPORT (OUTPATIENT)
Dept: REHABILITATION | Facility: HOSPITAL | Age: 79
End: 2019-11-21
Attending: INTERNAL MEDICINE
Payer: MEDICARE

## 2019-11-21 DIAGNOSIS — M54.12 CERVICAL RADICULOPATHY: ICD-10-CM

## 2019-11-21 DIAGNOSIS — M54.2 CERVICAL PAIN: ICD-10-CM

## 2019-11-21 DIAGNOSIS — R53.1 WEAKNESS: ICD-10-CM

## 2019-11-21 PROCEDURE — G8979 MOBILITY GOAL STATUS: HCPCS | Mod: CK,PN

## 2019-11-21 PROCEDURE — G8980 MOBILITY D/C STATUS: HCPCS | Mod: CJ,PN

## 2019-11-21 PROCEDURE — 97110 THERAPEUTIC EXERCISES: CPT | Mod: PN

## 2020-04-20 PROBLEM — R53.1 WEAKNESS: Status: RESOLVED | Noted: 2019-10-14 | Resolved: 2020-04-20

## 2020-04-20 PROBLEM — M54.2 CERVICAL PAIN: Status: RESOLVED | Noted: 2019-10-14 | Resolved: 2020-04-20

## 2020-04-20 PROBLEM — M54.12 CERVICAL RADICULOPATHY: Status: RESOLVED | Noted: 2019-10-14 | Resolved: 2020-04-20

## 2020-09-04 PROBLEM — M48.062 SPINAL STENOSIS OF LUMBAR REGION WITH NEUROGENIC CLAUDICATION: Status: ACTIVE | Noted: 2018-06-12

## 2020-09-04 PROBLEM — H25.9 AGE-RELATED CATARACT: Status: ACTIVE | Noted: 2019-07-17

## 2020-09-04 PROBLEM — B35.1 ONYCHOMYCOSIS: Status: ACTIVE | Noted: 2018-03-01

## 2020-09-04 PROBLEM — M10.9 GOUT: Status: ACTIVE | Noted: 2020-09-04

## 2020-09-04 PROBLEM — M47.816 ARTHROPATHY OF LUMBAR FACET JOINT: Status: ACTIVE | Noted: 2020-09-04

## 2020-09-04 PROBLEM — R29.818 NEUROGENIC CLAUDICATION: Status: ACTIVE | Noted: 2020-09-04

## 2020-09-04 PROBLEM — J30.9 ALLERGIC RHINITIS: Status: ACTIVE | Noted: 2019-04-04

## 2020-09-04 PROBLEM — E66.9 OBESITY: Status: ACTIVE | Noted: 2020-09-04

## 2020-09-04 PROBLEM — M25.519 SHOULDER PAIN: Status: ACTIVE | Noted: 2020-09-04

## 2020-09-04 PROBLEM — M50.30 DEGENERATION OF INTERVERTEBRAL DISC OF CERVICAL REGION: Status: ACTIVE | Noted: 2020-09-04

## 2020-09-22 PROBLEM — G89.29 CHRONIC PAIN OF BOTH SHOULDERS: Status: ACTIVE | Noted: 2020-09-04

## 2020-09-22 PROBLEM — M25.511 CHRONIC PAIN OF BOTH SHOULDERS: Status: ACTIVE | Noted: 2020-09-04

## 2020-09-22 PROBLEM — M25.512 CHRONIC PAIN OF BOTH SHOULDERS: Status: ACTIVE | Noted: 2020-09-04

## 2020-10-14 ENCOUNTER — TELEPHONE (OUTPATIENT)
Dept: ORTHOPEDICS | Facility: CLINIC | Age: 80
End: 2020-10-14

## 2020-10-14 DIAGNOSIS — M25.519 SHOULDER PAIN, UNSPECIFIED CHRONICITY, UNSPECIFIED LATERALITY: Primary | ICD-10-CM

## 2020-10-15 ENCOUNTER — OFFICE VISIT (OUTPATIENT)
Dept: ORTHOPEDICS | Facility: CLINIC | Age: 80
End: 2020-10-15
Payer: MEDICARE

## 2020-10-15 ENCOUNTER — HOSPITAL ENCOUNTER (OUTPATIENT)
Dept: RADIOLOGY | Facility: HOSPITAL | Age: 80
Discharge: HOME | End: 2020-10-15
Attending: ORTHOPAEDIC SURGERY
Payer: MEDICARE

## 2020-10-15 DIAGNOSIS — G89.29 CHRONIC PAIN OF BOTH SHOULDERS: ICD-10-CM

## 2020-10-15 DIAGNOSIS — M25.519 SHOULDER PAIN, UNSPECIFIED CHRONICITY, UNSPECIFIED LATERALITY: ICD-10-CM

## 2020-10-15 DIAGNOSIS — M25.511 CHRONIC PAIN OF BOTH SHOULDERS: ICD-10-CM

## 2020-10-15 DIAGNOSIS — M25.512 CHRONIC PAIN OF BOTH SHOULDERS: ICD-10-CM

## 2020-10-15 PROCEDURE — 73030 X-RAY EXAM OF SHOULDER: CPT | Mod: 26,50,, | Performed by: RADIOLOGY

## 2020-10-15 PROCEDURE — 99203 PR OFFICE/OUTPT VISIT, NEW, LEVL III, 30-44 MIN: ICD-10-PCS | Mod: 25,S$GLB,, | Performed by: ORTHOPAEDIC SURGERY

## 2020-10-15 PROCEDURE — 1125F AMNT PAIN NOTED PAIN PRSNT: CPT | Mod: S$GLB,,, | Performed by: ORTHOPAEDIC SURGERY

## 2020-10-15 PROCEDURE — 1159F MED LIST DOCD IN RCRD: CPT | Mod: S$GLB,,, | Performed by: ORTHOPAEDIC SURGERY

## 2020-10-15 PROCEDURE — 73030 XR SHOULDER COMPLETE 2 OR MORE VIEWS BILATERAL: ICD-10-PCS | Mod: 26,50,, | Performed by: RADIOLOGY

## 2020-10-15 PROCEDURE — 1101F PR PT FALLS ASSESS DOC 0-1 FALLS W/OUT INJ PAST YR: ICD-10-PCS | Mod: CPTII,S$GLB,, | Performed by: ORTHOPAEDIC SURGERY

## 2020-10-15 PROCEDURE — 99203 OFFICE O/P NEW LOW 30 MIN: CPT | Mod: 25,S$GLB,, | Performed by: ORTHOPAEDIC SURGERY

## 2020-10-15 PROCEDURE — 1159F PR MEDICATION LIST DOCUMENTED IN MEDICAL RECORD: ICD-10-PCS | Mod: S$GLB,,, | Performed by: ORTHOPAEDIC SURGERY

## 2020-10-15 PROCEDURE — 99999 PR PBB SHADOW E&M-EST. PATIENT-LVL III: ICD-10-PCS | Mod: PBBFAC,,, | Performed by: ORTHOPAEDIC SURGERY

## 2020-10-15 PROCEDURE — 20610 DRAIN/INJ JOINT/BURSA W/O US: CPT | Mod: 50,S$GLB,, | Performed by: ORTHOPAEDIC SURGERY

## 2020-10-15 PROCEDURE — 20610 PR DRAIN/INJECT LARGE JOINT/BURSA: ICD-10-PCS | Mod: 50,S$GLB,, | Performed by: ORTHOPAEDIC SURGERY

## 2020-10-15 PROCEDURE — 1101F PT FALLS ASSESS-DOCD LE1/YR: CPT | Mod: CPTII,S$GLB,, | Performed by: ORTHOPAEDIC SURGERY

## 2020-10-15 PROCEDURE — 1125F PR PAIN SEVERITY QUANTIFIED, PAIN PRESENT: ICD-10-PCS | Mod: S$GLB,,, | Performed by: ORTHOPAEDIC SURGERY

## 2020-10-15 PROCEDURE — 73030 X-RAY EXAM OF SHOULDER: CPT | Mod: TC,50,PN

## 2020-10-15 PROCEDURE — 99999 PR PBB SHADOW E&M-EST. PATIENT-LVL III: CPT | Mod: PBBFAC,,, | Performed by: ORTHOPAEDIC SURGERY

## 2020-10-15 RX ORDER — TRIAMCINOLONE ACETONIDE 40 MG/ML
40 INJECTION, SUSPENSION INTRA-ARTICULAR; INTRAMUSCULAR
Status: COMPLETED | OUTPATIENT
Start: 2020-10-15 | End: 2020-10-15

## 2020-10-15 RX ADMIN — TRIAMCINOLONE ACETONIDE 40 MG: 40 INJECTION, SUSPENSION INTRA-ARTICULAR; INTRAMUSCULAR at 10:10

## 2020-10-15 NOTE — PROGRESS NOTES
Subjective:      Patient ID: Cesar Eubanks is a 80 y.o. male.    Chief Complaint: Pain of the Right Shoulder and Pain of the Left Shoulder      HPI  Cesar Eubanks is a  80 y.o. male presenting today for bilateral shoulder pain.  There was not a history of trauma.  Onset of symptoms began several years ago  He actually had physical therapy about a year ago with some slight improvement  He does have some neck problems as well  Recently he has been having increased pain in both shoulders right worse than left  He also reports weakness and difficulty with lifting of the right arm  No numbness or tingling currently although he has had that in the past.      Review of patient's allergies indicates:   Allergen Reactions    Penicillins          Current Outpatient Medications   Medication Sig Dispense Refill    allopurinoL (ZYLOPRIM) 300 MG tablet 1 tablet      cetirizine (ZYRTEC) 5 MG chewable tablet Take 5 mg by mouth once daily.        colchicine (MITIGARE) 0.6 mg Cap Take two pills at the onset of gout attack, then 1 pill two hours later.  Then take 1 pill twice a day as needed for continued gout pain. 20 capsule 4    diclofenac (VOLTAREN) 75 MG EC tablet       fluticasone propionate (FLONASE) 50 mcg/actuation nasal spray 1 spray in each nostril      lisinopril-hydrochlorothiazide (PRINZIDE,ZESTORETIC) 20-25 mg Tab       NIFEDICAL XL 60 mg 24 hr tablet       tamsulosin (FLOMAX) 0.4 mg Cap TAKE ONE CAPSULE BY MOUTH EVERY DAY 90 capsule 3    tramadol (ULTRAM) 50 mg tablet       zolpidem (AMBIEN) 10 mg Tab TAKE 1 TABLET BY MOUTH EVERY NIGHT AT BEDTIME AS NEEDED 30 tablet 4     No current facility-administered medications for this visit.        Past Medical History:   Diagnosis Date    Arthritis     Hernia 6/2013    Hypertension     Polymyalgia rheumatica        Past Surgical History:   Procedure Laterality Date    APPENDECTOMY      CHOLECYSTECTOMY  10/17/12    COLONOSCOPY      HERNIA  REPAIR  07/11/2013    laparoscopic cholecystectomy  10/17/12    PILONIDAL CYST DRAINAGE      TONSILLECTOMY         Review of Systems:  ROS    OBJECTIVE:     PHYSICAL EXAM:       Vitals:    10/15/20 0943   PainSc:   5     Well developed, well nourished male in no acute distress  Alert and oriented x 3  HEENT- Normal exam  Lungs- Clear to auscultation  Heart- Regular rate and rhythm  Abdomen- Soft nontender  Extremity exam- examination right shoulder demonstrates no tenderness no swelling  Range of motion slightly decreased secondary to pain and stiffness  Mild crepitation  Left shoulder has better motion  There is weakness of the rotator cuff bilaterally      RADIOGRAPHS:  AP and lateral x-rays bilateral shoulders demonstrate rotator cuff arthropathy with elevation of the humeral head right shoulder worse than left    Comments: I have personally reviewed the imaging and I agree with the above radiologist's report.    ASSESSMENT/PLAN:     IMPRESSION:  Bilateral rotator cuff arthropathy right worse than left    PLAN:  I explained the nature of the problem to the patient  Recommended injections today  After pause for time-out identified each shoulder injected bilaterally with combination Kenalog 40 mg 2 cc xylocaine sterile technique  He tolerated the procedure well without complication  I have also ordered some physical therapy for strengthening both shoulders  We can incorporate the neck as well if needed  Follow up 1-2 months       - We talked at length about the anatomy and pathophysiology of   Encounter Diagnosis   Name Primary?    Chronic pain of both shoulders            Disclaimer: This note has been generated using voice-recognition software. There may be typographical errors that have been missed during proof-reading.

## 2020-11-12 ENCOUNTER — CLINICAL SUPPORT (OUTPATIENT)
Dept: REHABILITATION | Facility: HOSPITAL | Age: 80
End: 2020-11-12
Attending: ORTHOPAEDIC SURGERY
Payer: MEDICARE

## 2020-11-12 DIAGNOSIS — M62.81 MUSCLE WEAKNESS: ICD-10-CM

## 2020-11-12 DIAGNOSIS — M25.512 CHRONIC PAIN OF BOTH SHOULDERS: ICD-10-CM

## 2020-11-12 DIAGNOSIS — M89.9 SCAPULAR DYSFUNCTION: ICD-10-CM

## 2020-11-12 DIAGNOSIS — M25.511 CHRONIC PAIN OF BOTH SHOULDERS: ICD-10-CM

## 2020-11-12 DIAGNOSIS — G89.29 CHRONIC PAIN OF BOTH SHOULDERS: ICD-10-CM

## 2020-11-12 PROCEDURE — 97161 PT EVAL LOW COMPLEX 20 MIN: CPT | Mod: PN

## 2020-11-12 PROCEDURE — 97110 THERAPEUTIC EXERCISES: CPT | Mod: PN

## 2020-11-13 NOTE — PLAN OF CARE
"[facility]  Physical Therapy Initial Evaluation    Name: Cesar Eubanks  Clinic Number: [id number]    Therapy Diagnosis:   Encounter Diagnoses   Name Primary?    Chronic pain of both shoulders     Scapular dysfunction     Muscle weakness      Physician: Dmitry Taylor Jr., *    Physician Orders: PT Eval and Treat   Medical Diagnosis from Referral: M25.511,G89.29,M25.512 (ICD-10-CM) - Chronic pain of both shoulders   Evaluation Date: 11/12/2020  Authorization Period Expiration: 1/12/2020  Plan of Care Expiration: 11/12/2020 to 1/12/2021  Visit # / Visits authorized: 1/ 12  FOTO#:1/5    Time In: 12:15  Time Out: 1:00  Total Billable Time: 45 minutes (1LCE, 1TE)    Precautions: Standard    Subjective     Date of onset: 6 months    History of current condition - Cesar reports: Pt reports that he thinks he may have a torn RC on the R shoulder and also that he has L shoulder pain. Pt reports that his R shoulder has gotten worse over the last 6 months. "If I understood my doc correctly, its either you guys, or surgery." Pt was told to try PT to see if it works. Pt reports he thinks he R shoulder is weaker than the L. Putting a quart of milk on the top level of the frige is very difficulty. Pt has a lot of hobbies and states he has noticed that he cannot hold his hand gun as much as he use to be able to. Pt states has minimal pain but he thinks is mostly the strength. Pt states he has some trouble sleeping but he sleeps on his stomach with his arms overhead. Pt says he has tried other sleeping positions but has been unable to get comfortable. Pt would like to stop this from getting worse. Pt did receive 2 cortisone injections about 6 weeks ago. Hx of B carpal tunnel release.     Medical History:   Past Medical History:   Diagnosis Date    Arthritis     Hernia 6/2013    Hypertension     Polymyalgia rheumatica        Surgical History:   Cesar Eubanks  has a past surgical " "history that includes Colonoscopy; Pilonidal cyst drainage; laparoscopic cholecystectomy (10/17/12); Cholecystectomy (10/17/12); Appendectomy; Tonsillectomy; and Hernia repair (07/11/2013).    Medications:   Cesar has a current medication list which includes the following prescription(s): allopurinol, cetirizine, colchicine, diclofenac, fluticasone propionate, lisinopril-hydrochlorothiazide, nifedical xl, tamsulosin, tramadol, and zolpidem.    Allergies:   Review of patient's allergies indicates:   Allergen Reactions    Penicillins         Imaging, xray: Glenohumeral and AC joint articulations appear maintained with moderate DJD.  Periarticular spurring, more so involving the right AC joint.  No acute fracture, dislocation, or osseous destruction.    Prior Therapy: Prior PT for B TKA and for his neck.   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     Pain:  Current 0/10, worst 6/10, best 0/10   Location: bilateral shoulder   Description: "its just there, I dont know how to describe it."  Aggravating Factors: Night Time  Easing Factors: pain goes away when he moves positions.     Pts goals: Pt would like to be able to sleep at night without the possibility of being woken up and he would like to prevent things from getting worse.     Objective     Posture: Pt sits c B rounded shoulders and forward head posture. Pt demonstrates poor scapulohumeral rhythm on R  Gait: B lat sway   Palpation: Tender of R biceps tendon  Sensation: Intact  DTRs: diminished bilat  Myotomes: normal  Dermatomes: normal    Range of Motion/Strength:     CERVICAL ROM: All cervical ROM WNL     SHOULDER ROM: All shoulder ROM was WNL however pt demonstrates scapular elevation on the R during abd and " flexion. PROM has slight painful endfeel on the R.       U/E MMT Right Left Pain/Dysfunction with Movement   Shoulder Flexion 4+/5 4+/5    Shoulder Extension 5/5 5/5    Shoulder Abduction 4+/5 4+/5    Shoulder Adduction 5/5 5/5    Shoulder IR 4+/5 4+/5    Shoulder ER  @ 0* Abduction 4/5 4+/5    Shoulder ER  @ 90* Abduction 4/5 4+/5    Elbow Flexion  5/5 5/5    Elbow Extension 5/5 5/5    Rhomboids 4+/5 4+/5    Mid Traps 4+/5 4+/5    Low Traps 4-/5 4-/5        Joint Mobility:   - Thoracic: Stiffness throughout the upper thoracic spine.  - GHJ: Pt sits ant in GHJ B and has some increased motion posteriorly however laxity not present.     Flexibility: Tight pecs, tight into internal rotation    Special Tests:   - Holger Gomez  - Drop Arm  - Infraspinatus  + Lift off - pt does not have the flexibility to get arm behind back enough to perform test.       CMS Impairment/Limitation/Restriction for FOTO  Survey    Therapist reviewed FOTO scores for Cesar Ramirezgavin on 11/12/2020.   FOTO documents entered into [employer] - see Media section.    Limitation Score: 47%  Predicted Score: 34%  DASH - 35.8       TREATMENT     Treatment Time In: 12:50  Treatment Time Out:1:00PM  Total Treatment time separate from Evaluation: 10 minutes    Cesar received therapeutic exercises to develop strength, endurance, ROM, flexibility, posture and core stabilization for 10 minutes including:  Scapular retraction  B ER wall Slides (cools)  Cat Cow for thoracic mobility  B ER c RTB      NEXT VISIT:  Prone Y  Prone T  Prone Row  Pec stretch    Home Exercises and Patient Education Provided    Education provided:   - Pt educated on POC  - Pt educated on HEP  - Pt educated on anatomy and physiology of current condition as it relates to signs and symptoms     Written Home Exercises Provided: yes.  Exercises were reviewed and Cesar was able to demonstrate them prior to the end of the session.  Cesar demonstrated good  understanding of the  education provided.     See EMR under Patient Instructions for exercises provided initial evaluation.    Assessment     Cesar is a 80 y.o. male referred to outpatient Physical Therapy with a medical diagnosis of Chronic pain of both shoulders.  Pt demonstrates poor scapulohumeral rhythm on the R and crepitis on the L indicative of poor scapular stabilization and arthritis. Pt's sits c rounded shoulders and slightly forward head posture. Pt has slight weakness B but more in R compared to L. Pt may have some impingement on the R due to poor muscle coordination.     Pt will benefit from skilled outpatient Physical Therapy to address the deficits stated above and in the chart below, provide pt/family education, and to maximize pt's level of independence.   Pt prognosis is Good.       Plan of care discussed with patient: Yes  Pt's spiritual, cultural and educational needs considered and pt agreeable to plan of care and goals as stated below:     Anticipated Barriers for therapy: Chronicity of shoulder pain       Medical Necessity is demonstrated by the following  History  Co-morbidities and personal factors that may impact the plan of care Co-morbidities:   high BMI and HTN    Personal Factors:   no deficits     moderate   Examination  Body Structures and Functions, activity limitations and participation restrictions that may impact the plan of care Body Regions:   upper extremities    Body Systems:    ROM  strength  motor control    Participation Restrictions:   none    Activity limitations:   Learning and applying knowledge  no deficits    General Tasks and Commands  no deficits    Communication  no deficits    Mobility  lifting and carrying objects    Self care  no deficits    Domestic Life  no deficits    Interactions/Relationships  no deficits    Life Areas  no deficits    Community and Social Life  no deficits         moderate   Clinical Presentation stable and uncomplicated low   Decision Making/ Complexity  Score: low         Goals:  Short Term Goals (4 Weeks):   1) 1. Pt will be compliant with initial HEP to supplement PT in decreasing pain with functional mobility.  2) Pt will improve motion of the shoulder and will demonstrated decreased shoulder hike with end range shoulder abd on the R  3) Pt will improve pain to a score of 4/10 on VAS in order to do wood working   4) Pt will be able to sleep for >5hours without being woken up by pain in order to get enough rest    Long Term Goals (8 Weeks):  1) Pt will be compliant with final HEP to reduce risk of further injury after dc  2) Pt will improve FOTO to a score of 34% indicating overall improvement in function   3) Pt will improve pain to a level of 2/10 in order to do wood working  4) Pt will be able to sleep >7hours without being woken up by pain in order to get enough rest.     Plan     Plan of care Certification: 11/12/2020 to 1/12/2021    Outpatient Physical Therapy 2 times weekly for 8 weeks to include the following interventions: Electrical Stimulation PRN, Manual Therapy, Moist Heat/ Ice, Neuromuscular Re-ed, Patient Education, Therapeutic Activites and Therapeutic Exercise. All other modalities PRN. Pt will be treated in conjunction with PTA prn.     Soco Victor, PT, DPT

## 2020-11-19 ENCOUNTER — CLINICAL SUPPORT (OUTPATIENT)
Dept: REHABILITATION | Facility: HOSPITAL | Age: 80
End: 2020-11-19
Attending: ORTHOPAEDIC SURGERY
Payer: MEDICARE

## 2020-11-19 DIAGNOSIS — M62.81 MUSCLE WEAKNESS: ICD-10-CM

## 2020-11-19 DIAGNOSIS — M89.9 SCAPULAR DYSFUNCTION: ICD-10-CM

## 2020-11-19 PROCEDURE — 97110 THERAPEUTIC EXERCISES: CPT | Mod: PN,CQ

## 2020-11-19 NOTE — PROGRESS NOTES
"  Physical Therapy Treatment Note     Name: Cesar Eubanks  Clinic Number: [id number]    Therapy Diagnosis:   Encounter Diagnoses   Name Primary?    Scapular dysfunction     Muscle weakness      Physician: Dmitry Taylor Jr., *    Visit Date: 11/19/2020    Physician Orders: PT Eval and Treat   Medical Diagnosis from Referral: M25.511,G89.29,M25.512 (ICD-10-CM) - Chronic pain of both shoulders   Evaluation Date: 11/12/2020  Authorization Period Expiration: 1/12/2020  Plan of Care Expiration: 11/12/2020 to 1/12/2021  Visit # / Visits authorized: 2/ 12  FOTO#: 2/5    Time In: 1515  Time Out: 1600  Total Billable Time: 45 minutes (3TE)    Precautions: Standard    Subjective     Pt reports: that he has little pain just really wants to work on strengthening.  He was compliant with home exercise program.  Response to previous treatment: previous treatment was IE  Functional change: none noted    Pain: 3/10  Location: bilateral shoulder      Objective     Cesar received therapeutic exercises to develop strength, endurance, ROM, flexibility and posture for 45 minutes including:    Serratus punches: 2x10 with blue bar  Supine wand flexion: 2x10 with blue wand   Sidelying ER: 2x10 B  Sidelying ABD: 2x10 B  Prone Y: 2x8  Prone T: 2x8  Prone Row: 2x8  Seated thoracic extension: x15  Scapular retraction: 2x10  B ER c RTB: 2x10  Standing AAROM shoulder ABD with blue wand: 2x10 B  Serratus wall slides: 2x8  Doorway Pec stretch: 3x15"      Home Exercises Provided and Patient Education Provided     Education provided:   - Cont HEP      Written Home Exercises Provided: Patient instructed to cont prior HEP.  Exercises were reviewed and Cesar was able to demonstrate them prior to the end of the session.  Cesar demonstrated good  understanding of the education provided.     See EMR under Patient Instructions for exercises provided prior visit.    Assessment     Cesar is a 80 y.o. male referred to outpatient Physical Therapy " with a medical diagnosis of Chronic pain of both shoulders. Cesar tolerated first follow up treatment well. Patient present with weakness of B shoulders R>L. Session concentrated on improving strength and ROM of B shoulders. Patient needed frequent verbal and tactile cuing for proper form of therapeutic exercises and to avoid compensation. Patient noted a slight increase in shoulder pain with doorway pec stretch that left when patient was instructed to decrease intensity of stretch. Patient stated that he was compliant with all HEP except cat/cow exercises due to bilateral knee pain.   Cesar is progressing well towards his goals.   Pt prognosis is Good.     Pt will continue to benefit from skilled outpatient physical therapy to address the deficits listed in the problem list box on initial evaluation, provide pt/family education and to maximize pt's level of independence in the home and community environment.     Pt's spiritual, cultural and educational needs considered and pt agreeable to plan of care and goals.     Anticipated barriers to physical therapy: Chronicity of shoulder pain     Goals:  Short Term Goals (4 Weeks):   1) 1. Pt will be compliant with initial HEP to supplement PT in decreasing pain with functional mobility. PROGRESSING, NOT MET  2) Pt will improve motion of the shoulder and will demonstrated decreased shoulder hike with end range shoulder abd on the R PROGRESSING, NOT MET  3) Pt will improve pain to a score of 4/10 on VAS in order to do wood working PROGRESSING, NOT MET  4) Pt will be able to sleep for >5hours without being woken up by pain in order to get enough rest PROGRESSING, NOT MET     Long Term Goals (8 Weeks):  1) Pt will be compliant with final HEP to reduce risk of further injury after dc PROGRESSING, NOT MET  2) Pt will improve FOTO to a score of 34% indicating overall improvement in function PROGRESSING, NOT MET  3) Pt will improve pain to a level of 2/10 in order to do wood  working PROGRESSING, NOT MET  4) Pt will be able to sleep >7hours without being woken up by pain in order to get enough rest. PROGRESSING, NOT MET    Plan     Plan of care Certification: 11/12/2020 to 1/12/2021  Cont with treatment per POC    Shruti Grant, PTA

## 2020-11-30 ENCOUNTER — CLINICAL SUPPORT (OUTPATIENT)
Dept: REHABILITATION | Facility: HOSPITAL | Age: 80
End: 2020-11-30
Attending: ORTHOPAEDIC SURGERY
Payer: MEDICARE

## 2020-11-30 DIAGNOSIS — M62.81 MUSCLE WEAKNESS: ICD-10-CM

## 2020-11-30 DIAGNOSIS — M89.9 SCAPULAR DYSFUNCTION: ICD-10-CM

## 2020-11-30 PROCEDURE — 97110 THERAPEUTIC EXERCISES: CPT | Mod: PN,CQ

## 2020-11-30 NOTE — PROGRESS NOTES
"  Physical Therapy Treatment Note     Name: Cesar Eubanks  Clinic Number: [id number]    Therapy Diagnosis:   Encounter Diagnoses   Name Primary?    Scapular dysfunction     Muscle weakness      Physician: Dmitry Taylor Jr., *    Visit Date: 11/30/2020    Physician Orders: PT Eval and Treat   Medical Diagnosis from Referral: M25.511,G89.29,M25.512 (ICD-10-CM) - Chronic pain of both shoulders   Evaluation Date: 11/12/2020  Authorization Period Expiration: 1/12/2020  Plan of Care Expiration: 11/12/2020 to 1/12/2021  Visit # / Visits authorized: 3/ 12  FOTO#: 3/5    Time In: 3:25 pm  Time Out: 3:50 pm  Total Billable Time: 25 minutes (2TE)    Precautions: Standard    Subjective     Pt reports: not really having any pain today.  Patient states he was late waiting for a sitter for his wife and needs to leave early because the sitter can't stay long.  Patient reports he is performing some of his exercises all throughout the day.  He was compliant with home exercise program.  Response to previous treatment: less pain.  Functional change: none noted    Pain: 0/10  Location: bilateral shoulder      Objective     Cesar received therapeutic exercises to develop strength, endurance, ROM, flexibility and posture for 25 minutes including:    Serratus punches: 2x10 with blue bar  Supine wand flexion: 2x10 with blue wand   Sidelying ER: 2x10 B  Sidelying ABD: 2x10 B  Prone Y: 2x10  Prone T: 2x10   Prone Row: 2x10   Seated thoracic extension: x15 - NOT TODAY  Scapular retraction: 2x10  B ER c RTB: 2x10  Standing AAROM shoulder ABD with blue wand: 2x10 B - NOT TODAY  Serratus wall slides: 2x8 - NOT TODAY  Doorway Pec stretch: 3x15" - NOT TODAY      Home Exercises Provided and Patient Education Provided     Education provided:   - Cont HEP    Written Home Exercises Provided: Patient instructed to cont prior HEP.  Exercises were reviewed and Cesar was able to demonstrate them prior to the end of the session.  Cesar " demonstrated good  understanding of the education provided.     See EMR under Patient Instructions for exercises provided 11/13/2020.    Assessment     Patient did not complete all of his exercises due to arriving late and leaving early because of sitter problems for his wife. Patient needed frequent verbal and tactile cuing for proper form of therapeutic exercises and to avoid compensation.    Cesar is progressing well towards his goals.   Pt prognosis is Good.     Pt will continue to benefit from skilled outpatient physical therapy to address the deficits listed in the problem list box on initial evaluation, provide pt/family education and to maximize pt's level of independence in the home and community environment.     Pt's spiritual, cultural and educational needs considered and pt agreeable to plan of care and goals.     Anticipated barriers to physical therapy: Chronicity of shoulder pain     Goals:  Short Term Goals (4 Weeks):   1) 1. Pt will be compliant with initial HEP to supplement PT in decreasing pain with functional mobility. PROGRESSING, NOT MET  2) Pt will improve motion of the shoulder and will demonstrated decreased shoulder hike with end range shoulder abd on the R PROGRESSING, NOT MET  3) Pt will improve pain to a score of 4/10 on VAS in order to do wood working PROGRESSING, NOT MET  4) Pt will be able to sleep for >5hours without being woken up by pain in order to get enough rest PROGRESSING, NOT MET    Long Term Goals (8 Weeks):  1) Pt will be compliant with final HEP to reduce risk of further injury after dc PROGRESSING, NOT MET  2) Pt will improve FOTO to a score of 34% indicating overall improvement in function PROGRESSING, NOT MET  3) Pt will improve pain to a level of 2/10 in order to do wood working PROGRESSING, NOT MET  4) Pt will be able to sleep >7hours without being woken up by pain in order to get enough rest. PROGRESSING, NOT MET    Plan     Plan of care Certification: 11/12/2020  to 1/12/2021  Cont with treatment per POC    Lane Mckinney, PTA

## 2020-12-02 NOTE — PROGRESS NOTES
"  Physical Therapy Treatment Note     Name: Cesar Eubanks  Clinic Number: [id number]    Therapy Diagnosis:   Encounter Diagnoses   Name Primary?    Scapular dysfunction     Muscle weakness      Physician: Dmitry Taylor Jr., *    Visit Date: 12/3/2020    Physician Orders: PT Eval and Treat   Medical Diagnosis from Referral: M25.511,G89.29,M25.512 (ICD-10-CM) - Chronic pain of both shoulders   Evaluation Date: 11/12/2020  Authorization Period Expiration: 1/12/2020  Plan of Care Expiration: 11/12/2020 to 1/12/2021  Visit # / Visits authorized: 3/ 12  FOTO#: 3/5    Time In: 10:50 am  Time Out: 11:33 pm  Total Billable Time:  43minutes (2TE, 1MT)    Precautions: Standard    Subjective     Pt reports: Pt states "you know, its not really pain im having trouble with, its mobility." Pt reports only doing seated exercises mostly and not all the time. Pt state he still has trouble with lifting a quart of milk to the top shelf of the fridge.   He was compliant with home exercise program.  Response to previous treatment: less pain.  Functional change: none noted    Pain: 0/10  Location: bilateral shoulder      Objective     Cesar received therapeutic exercises to develop strength, endurance, ROM, flexibility and posture for 30 minutes including:    UBE - 5min level 2  Serratus punches: 3x12 with blue bar (5lb)  Supine wand flexion: 3x10 with blue wand (5lb)  Sidelying ER: 2x10 B (progressed to sitting)  Seated ER B (no monies) - 3x12   Seated horizontal Abd no resistance - 3x12 - pt has to be cued to drop shoulders   Prone Y: 3x12  Prone T: 3x12   Prone Row: 3x12   Sidelying ABD: 2x10 B (not today)  Seated thoracic extension: x15 - NOT TODAY  Scapular retraction: 2x10 (not today)  B ER c RTB: 2x10 ( change to no resistance as pt was unable to get full motion)  Standing AAROM shoulder ABD with blue wand: 2x10 B - NOT TODAY  Serratus wall slides: 2x8 - NOT TODAY  Doorway Pec stretch: 3x15" - NOT TODAY    Cesar" received the following manual therapy techniques: Joint mobilizations, Myofacial release, Soft tissue Mobilization and Friction Massage were applied to the: Shoulders for 13 minutes, including:    Supine shoulder retraction PNF   Sidelying scapular ant elevation/post depression PNF - start with passive then active then resisted.   Manual Pec stretch supine         Home Exercises Provided and Patient Education Provided     Education provided:   - Cont HEP - check posture and work on bringing shoulders down.     Written Home Exercises Provided: Patient instructed to cont prior HEP.  Exercises were reviewed and Cesar was able to demonstrate them prior to the end of the session.  Cesar demonstrated good  understanding of the education provided.     See EMR under Patient Instructions for exercises provided 11/13/2020.    Assessment     Some exercises had to be adjusted today due to pt attempting to compensate c shoulder hike. Pt demonstrates weakness of the scpaular stabilizers especially lower trap. PNF manual today helped pt with cuing to activate shoulder retraction and scapular depression.     Cesar is progressing well towards his goals.   Pt prognosis is Good.     Pt will continue to benefit from skilled outpatient physical therapy to address the deficits listed in the problem list box on initial evaluation, provide pt/family education and to maximize pt's level of independence in the home and community environment.     Pt's spiritual, cultural and educational needs considered and pt agreeable to plan of care and goals.     Anticipated barriers to physical therapy: Chronicity of shoulder pain     Goals:  Short Term Goals (4 Weeks):   1) 1. Pt will be compliant with initial HEP to supplement PT in decreasing pain with functional mobility. PROGRESSING, NOT MET  2) Pt will improve motion of the shoulder and will demonstrated decreased shoulder hike with end range shoulder abd on the R PROGRESSING, NOT MET  3) Pt  will improve pain to a score of 4/10 on VAS in order to do wood working PROGRESSING, NOT MET  4) Pt will be able to sleep for >5hours without being woken up by pain in order to get enough rest PROGRESSING, NOT MET    Long Term Goals (8 Weeks):  1) Pt will be compliant with final HEP to reduce risk of further injury after dc PROGRESSING, NOT MET  2) Pt will improve FOTO to a score of 34% indicating overall improvement in function PROGRESSING, NOT MET  3) Pt will improve pain to a level of 2/10 in order to do wood working PROGRESSING, NOT MET  4) Pt will be able to sleep >7hours without being woken up by pain in order to get enough rest. PROGRESSING, NOT MET    Plan     Plan of care Certification: 11/12/2020 to 1/12/2021  Cont with treatment per POC - if questions about PNF ask [place], continue with cuing and stabilization of the scapula prior to elevation.     Soco Victor, PT

## 2020-12-03 ENCOUNTER — CLINICAL SUPPORT (OUTPATIENT)
Dept: REHABILITATION | Facility: HOSPITAL | Age: 80
End: 2020-12-03
Attending: ORTHOPAEDIC SURGERY
Payer: MEDICARE

## 2020-12-03 DIAGNOSIS — M89.9 SCAPULAR DYSFUNCTION: ICD-10-CM

## 2020-12-03 DIAGNOSIS — M62.81 MUSCLE WEAKNESS: ICD-10-CM

## 2020-12-03 PROCEDURE — 97140 MANUAL THERAPY 1/> REGIONS: CPT | Mod: PN

## 2020-12-03 PROCEDURE — 97110 THERAPEUTIC EXERCISES: CPT | Mod: PN

## 2020-12-09 NOTE — PROGRESS NOTES
Physical Therapy Treatment Note     Name: Cesar Eubanks  Clinic Number: [id number]    Therapy Diagnosis:   Encounter Diagnoses   Name Primary?    Scapular dysfunction     Muscle weakness      Physician: Dmitry Taylor Jr., *    Visit Date: 12/10/2020    Physician Orders: PT Eval and Treat   Medical Diagnosis from Referral: M25.511,G89.29,M25.512 (ICD-10-CM) - Chronic pain of both shoulders   Evaluation Date: 11/12/2020  Authorization Period Expiration: 1/12/2020  Plan of Care Expiration: 11/12/2020 to 1/12/2021  Visit # / Visits authorized: 5/ 12  FOTO#: 5/5 - Get FOTO Next visit  Time In: 1:00 pm  Time Out: 1:48 pm  Total Billable Time:  48minutes (2TE, 1MT)    Precautions: Standard    Subjective     Pt reports: Pt reports that after last session he started having pins and needles down the R arm. He states that it comes and goes but he started to notice it after last session. He reports he came to PT for this problem last year and is unsure if anything helped.   He was compliant with home exercise program.  Response to previous treatment: pins/needles down R arm intermittently.   Functional change: none noted    Pain: 0/10  Location: bilateral shoulder      Objective     Cesar received therapeutic exercises to develop strength, endurance, ROM, flexibility and posture for 30 minutes including:    UBE - 5min level 2  Serratus punches: 3x12 with blue bar (4lb) - decreased due to P/N last visit   Supine wand flexion: 3x10 with blue wand (4lb)  Seated ER B (no monies) - 3x12 c YTB  Seated horizontal Abd no resistance - 3x12 - pt has to be cued to drop shoulders   Prone Y: 3x12  Prone T: 3x12   Upper Trap Stretch - to L - R ended up causing tingling - [id number]   Standing Row - 3x12  Thoracic mobilization over foam - [id number]    Sidelying ER: 2x10 B (progressed to sitting)  Prone Row: 3x12 (not today)  Sidelying ABD: 2x10 B (not today)  Seated thoracic extension: x15 - NOT TODAY  Scapular retraction: 2x10 (not  "today)  B ER c RTB: 2x10 ( change to no resistance as pt was unable to get full motion)  Standing AAROM shoulder ABD with blue wand: 2x10 B - NOT TODAY  Serratus wall slides: 2x8 - NOT TODAY  Doorway Pec stretch: 3x15" - NOT TODAY    Cesar received the following manual therapy techniques: Joint mobilizations, Myofacial release, Soft tissue Mobilization and Friction Massage were applied to the: Shoulders for 18 minutes, including:    T1-T5 mobilization Grade 2-3  AP glide GHJ grade 2-3  Supine shoulder retraction PNF   Sidelying scapular ant elevation/post depression PNF - start with passive then active then resisted.   Manual Pec stretch supine         Home Exercises Provided and Patient Education Provided     Education provided:   - Cont HEP - check posture and work on bringing shoulders down.     Written Home Exercises Provided: Patient instructed to cont prior HEP.  Exercises were reviewed and Cesar was able to demonstrate them prior to the end of the session.  Cesar demonstrated good  understanding of the education provided.     See EMR under Patient Instructions for exercises provided 11/13/2020.    Assessment     Pt continues to have ROM deficits in B shoulders. The tingling was reproduced c R lat flexion of the neck during Upper trap stretching and while pt was attempting to perform B horizontal Abd. As soon as posture was corrected, tingling went away. Suspect pt fatigued after last visit and was not utilizing proper posture.     Cesar is progressing well towards his goals.   Pt prognosis is Good.     Pt will continue to benefit from skilled outpatient physical therapy to address the deficits listed in the problem list box on initial evaluation, provide pt/family education and to maximize pt's level of independence in the home and community environment.     Pt's spiritual, cultural and educational needs considered and pt agreeable to plan of care and goals.     Anticipated barriers to physical therapy: " Chronicity of shoulder pain     Goals:  Short Term Goals (4 Weeks):   1) 1. Pt will be compliant with initial HEP to supplement PT in decreasing pain with functional mobility. PROGRESSING, NOT MET  2) Pt will improve motion of the shoulder and will demonstrated decreased shoulder hike with end range shoulder abd on the R PROGRESSING, NOT MET  3) Pt will improve pain to a score of 4/10 on VAS in order to do wood working PROGRESSING, NOT MET  4) Pt will be able to sleep for >5hours without being woken up by pain in order to get enough rest PROGRESSING, NOT MET    Long Term Goals (8 Weeks):  1) Pt will be compliant with final HEP to reduce risk of further injury after dc PROGRESSING, NOT MET  2) Pt will improve FOTO to a score of 34% indicating overall improvement in function PROGRESSING, NOT MET  3) Pt will improve pain to a level of 2/10 in order to do wood working PROGRESSING, NOT MET  4) Pt will be able to sleep >7hours without being woken up by pain in order to get enough rest. PROGRESSING, NOT MET    Plan     Plan of care Certification: 11/12/2020 to 1/12/2021  Continue to work with pt on proper posture, scapular motor control and positioning.   Soco Victor, PT        no

## 2020-12-10 ENCOUNTER — CLINICAL SUPPORT (OUTPATIENT)
Dept: REHABILITATION | Facility: HOSPITAL | Age: 80
End: 2020-12-10
Attending: ORTHOPAEDIC SURGERY
Payer: MEDICARE

## 2020-12-10 DIAGNOSIS — M89.9 SCAPULAR DYSFUNCTION: ICD-10-CM

## 2020-12-10 DIAGNOSIS — M62.81 MUSCLE WEAKNESS: ICD-10-CM

## 2020-12-10 PROCEDURE — 97110 THERAPEUTIC EXERCISES: CPT | Mod: PN

## 2020-12-10 PROCEDURE — 97140 MANUAL THERAPY 1/> REGIONS: CPT | Mod: PN

## 2020-12-14 ENCOUNTER — CLINICAL SUPPORT (OUTPATIENT)
Dept: REHABILITATION | Facility: HOSPITAL | Age: 80
End: 2020-12-14
Attending: ORTHOPAEDIC SURGERY
Payer: MEDICARE

## 2020-12-14 DIAGNOSIS — M89.9 SCAPULAR DYSFUNCTION: ICD-10-CM

## 2020-12-14 DIAGNOSIS — M62.81 MUSCLE WEAKNESS: ICD-10-CM

## 2020-12-14 PROCEDURE — 97140 MANUAL THERAPY 1/> REGIONS: CPT | Mod: PN

## 2020-12-14 PROCEDURE — 97110 THERAPEUTIC EXERCISES: CPT | Mod: PN

## 2020-12-14 NOTE — PROGRESS NOTES
Physical Therapy Treatment Note     Name: Cesar Eubanks  Clinic Number: [id number]    Therapy Diagnosis:   Encounter Diagnoses   Name Primary?    Scapular dysfunction     Muscle weakness      Physician: Dmitry Taylor Jr., *    Visit Date: 12/14/2020    Physician Orders: PT Eval and Treat   Medical Diagnosis from Referral: M25.511,G89.29,M25.512 (ICD-10-CM) - Chronic pain of both shoulders   Evaluation Date: 11/12/2020  Authorization Period Expiration: 1/12/2020  Plan of Care Expiration: 11/12/2020 to 1/12/2021  Visit # / Visits authorized: 6/ 12  FOTO#: 6/10  Time In: 3:45 pm  Time Out: 4:30 pm  Total Billable Time:  45minutes (2TE, 1MT)    Precautions: Standard    Subjective     Pt reports: Pt reports onset of pain in the top corner of his scapular. He said it lasted about 2 days. He states he is worried PT is not going to fix his ability to get his milk out of the fridge as he still has to use momentum in order to get it to the counter.   He was compliant with home exercise program.  Response to previous treatment: pins/needles down R arm intermittently.   Functional change: none noted    Pain: 0/10  Location: bilateral shoulder      Objective     Cesar received therapeutic exercises to develop strength, endurance, ROM, flexibility and posture for 30 minutes including:    UBE - 5min level 2 (not today)  Serratus punches: 3x12 with blue bar (4lb)  AA Scaption c PT assisting upward rotation of scapular standing facing mirror 2x1min  Supine wand flexion: 3x10 with blue wand (4lb) (not today)  Seated ER B (no monies) - 3x12 c YTB  Seated horizontal Abd no resistance - 3x1min - pt cued to drop shoulder, shoulder abduct, extend elbows forward, then perform horizontal abduction   Prone Y: 3x12  Prone T: 3x12   Levator scap stretch - [id number] R  Upper Trap Stretch - to L - R ended up causing tingling - [id number] (changed to levator stretch)  Standing Row - 3x12(not today)  Thoracic mobilization over foam -  "x1min  Supine ER c 2lb - 3x12    Sidelying ER: 2x10 B (progressed to sitting - not today)  Prone Row: 3x12 (not today)  Sidelying ABD: 2x10 B (not today)  Seated thoracic extension: x15 - NOT TODAY  Scapular retraction: 2x10 (not today)  B ER c RTB: 2x10 ( change to no resistance as pt was unable to get full motion)  Standing AAROM shoulder ABD with blue wand: 2x10 B - NOT TODAY  Serratus wall slides: 2x8 - NOT TODAY  Doorway Pec stretch: 3x15" - NOT TODAY    Cesar received the following manual therapy techniques: Joint mobilizations, Myofacial release, Soft tissue Mobilization and Friction Massage were applied to the: Shoulders for 15 minutes, including:    T1-T5 mobilization Grade 2-3  AP glide GHJ grade 2-3 (not today)  Sidelying scapular ant elevation/post depression PNF - start with passive then active then resisted.   Manual Pec stretch supine (not today)        Home Exercises Provided and Patient Education Provided     Education provided:   - Cont HEP - check posture and work on bringing shoulders down.   - Pt educated on the rotator cuff and how it functions to move the scapula into the proper positioning when reaching. Pt educated on time frame of healing and avoiding aggravating activities that may hinder healing.     Written Home Exercises Provided: Patient instructed to cont prior HEP.  Exercises were reviewed and Cesar was able to demonstrate them prior to the end of the session.  Cesar demonstrated good  understanding of the education provided.     See EMR under Patient Instructions for exercises provided 11/13/2020.    Assessment     Pt continue to have B shoulder dysfunction R>L. Pt has extremely poor scapular control and has to be repeatedly cued both physically and verbally. Pt educated on posture and how it can impact mechanics of lifting and reaching. Pt educated on scapulo-humeral rhythm and why it is important to continue to work on strength in order to gain proper motor control of the " scapula.     Cesar is progressing well towards his goals.   Pt prognosis is Good.     Pt will continue to benefit from skilled outpatient physical therapy to address the deficits listed in the problem list box on initial evaluation, provide pt/family education and to maximize pt's level of independence in the home and community environment.     Pt's spiritual, cultural and educational needs considered and pt agreeable to plan of care and goals.     Anticipated barriers to physical therapy: Chronicity of shoulder pain     Goals:  Short Term Goals (4 Weeks):   1) 1. Pt will be compliant with initial HEP to supplement PT in decreasing pain with functional mobility. PROGRESSING, NOT MET  2) Pt will improve motion of the shoulder and will demonstrated decreased shoulder hike with end range shoulder abd on the R PROGRESSING, NOT MET  3) Pt will improve pain to a score of 4/10 on VAS in order to do wood working PROGRESSING, NOT MET  4) Pt will be able to sleep for >5hours without being woken up by pain in order to get enough rest PROGRESSING, NOT MET    Long Term Goals (8 Weeks):  1) Pt will be compliant with final HEP to reduce risk of further injury after dc PROGRESSING, NOT MET  2) Pt will improve FOTO to a score of 34% indicating overall improvement in function PROGRESSING, NOT MET  3) Pt will improve pain to a level of 2/10 in order to do wood working PROGRESSING, NOT MET  4) Pt will be able to sleep >7hours without being woken up by pain in order to get enough rest. PROGRESSING, NOT MET    Plan     Continue to work on scapulo-humeral rhythm and motor control. Check on new onset of levator discomfort.   Soco Victor, PT

## 2020-12-17 ENCOUNTER — CLINICAL SUPPORT (OUTPATIENT)
Dept: REHABILITATION | Facility: HOSPITAL | Age: 80
End: 2020-12-17
Attending: ORTHOPAEDIC SURGERY
Payer: MEDICARE

## 2020-12-17 DIAGNOSIS — M89.9 SCAPULAR DYSFUNCTION: ICD-10-CM

## 2020-12-17 DIAGNOSIS — M62.81 MUSCLE WEAKNESS: ICD-10-CM

## 2020-12-17 PROCEDURE — 97110 THERAPEUTIC EXERCISES: CPT | Mod: PN,CQ

## 2020-12-17 NOTE — PROGRESS NOTES
Physical Therapy Treatment Note     Name: Cesar Eubanks  Clinic Number: [id number]    Therapy Diagnosis:   Encounter Diagnoses   Name Primary?    Scapular dysfunction     Muscle weakness      Physician: Dmitry Taylor Jr., *    Visit Date: 12/17/2020    Physician Orders: PT Eval and Treat   Medical Diagnosis from Referral: M25.511,G89.29,M25.512 (ICD-10-CM) - Chronic pain of both shoulders   Evaluation Date: 11/12/2020  Authorization Period Expiration: 1/12/2020  Plan of Care Expiration: 11/12/2020 to 1/12/2021  Visit # / Visits authorized: 7/ 12  FOTO#: 7/10  Time In: 10:50 pm  Time Out: 11:30 pm  Total Billable Time:  40 minutes (3TE)    Precautions: Standard    Subjective     Pt reports: Pt reports feeling better after last treatment.  He was compliant with home exercise program.  Response to previous treatment:    Functional change: none noted    Pain: 0/10  Location: bilateral shoulder      Objective     Cesar received therapeutic exercises to develop strength, endurance, ROM, flexibility and posture for 40 minutes including:    UBE - 5min level 2 (not today)  Serratus punches: 3x12 with blue bar (4lb)  AA Scaption c PT assisting upward rotation of scapular standing facing mirror 2x1min  Supine wand flexion: 3x10 with blue wand (4lb) (not today)  Seated ER B (no monies) - 3x12 c YTB  Seated horizontal Abd no resistance - 3x1min - pt cued to drop shoulder, shoulder abduct, extend elbows forward, then perform horizontal abduction   Prone Y: 3x12  Prone T: 3x12   Levator scap stretch - [id number] R  Upper Trap Stretch - to L - R ended up causing tingling - [id number] (changed to levator stretch)  Standing Row - 3x12(not today)  Thoracic mobilization over foam - x1min  Supine ER c 2lb - 3x12    Sidelying ER: 2x10 B (progressed to sitting - not today)  Prone Row: 3x12 (not today)  Sidelying ABD: 2x10 B (not today)  Seated thoracic extension: x15 - NOT TODAY  Scapular retraction: 2x10 (not today)  B ER c RTB:  "2x10 ( change to no resistance as pt was unable to get full motion)  Standing AAROM shoulder ABD with blue wand: 2x10 B - NOT TODAY  Serratus wall slides: 2x8 - NOT TODAY  Doorway Pec stretch: 3x15" - NOT TODAY    Cesar received the following manual therapy techniques: Joint mobilizations, Myofacial release, Soft tissue Mobilization and Friction Massage were applied to the: Shoulders for 00 minutes, including:    T1-T5 mobilization Grade 2-3 Not today  AP glide GHJ grade 2-3 (not today)  Sidelying scapular ant elevation/post depression PNF - start with passive then active then resisted. Not today  Manual Pec stretch supine (not today)        Home Exercises Provided and Patient Education Provided     Education provided:   - Cont HEP - check posture and work on bringing shoulders down.   - Pt educated on the rotator cuff and how it functions to move the scapula into the proper positioning when reaching. Pt educated on time frame of healing and avoiding aggravating activities that may hinder healing.     Written Home Exercises Provided: Patient instructed to cont prior HEP.  Exercises were reviewed and Cesar was able to demonstrate them prior to the end of the session.  Cesar demonstrated good  understanding of the education provided.     See EMR under Patient Instructions for exercises provided 11/13/2020.    Assessment     Pt continue to have B shoulder dysfunction R>L. Pt has extremely poor scapular control and has to be repeatedly cued both physically and verbally. Patient showed improvement with shoulder horizontal abduction today with having good shoulder placement and not hiking as much. Patient reported no increase in pain following therapy today.     Cesar is progressing well towards his goals.   Pt prognosis is Good.     Pt will continue to benefit from skilled outpatient physical therapy to address the deficits listed in the problem list box on initial evaluation, provide pt/family education and to " maximize pt's level of independence in the home and community environment.     Pt's spiritual, cultural and educational needs considered and pt agreeable to plan of care and goals.     Anticipated barriers to physical therapy: Chronicity of shoulder pain     Goals:  Short Term Goals (4 Weeks):   1) 1. Pt will be compliant with initial HEP to supplement PT in decreasing pain with functional mobility. PROGRESSING, NOT MET  2) Pt will improve motion of the shoulder and will demonstrated decreased shoulder hike with end range shoulder abd on the R PROGRESSING, NOT MET  3) Pt will improve pain to a score of 4/10 on VAS in order to do wood working PROGRESSING, NOT MET  4) Pt will be able to sleep for >5hours without being woken up by pain in order to get enough rest PROGRESSING, NOT MET    Long Term Goals (8 Weeks):  1) Pt will be compliant with final HEP to reduce risk of further injury after dc PROGRESSING, NOT MET  2) Pt will improve FOTO to a score of 34% indicating overall improvement in function PROGRESSING, NOT MET  3) Pt will improve pain to a level of 2/10 in order to do wood working PROGRESSING, NOT MET  4) Pt will be able to sleep >7hours without being woken up by pain in order to get enough rest. PROGRESSING, NOT MET    Plan     Continue to work on scapulo-humeral rhythm and motor control. Check on new onset of levator discomfort.   Shruti Grant, PTA

## 2020-12-21 ENCOUNTER — CLINICAL SUPPORT (OUTPATIENT)
Dept: REHABILITATION | Facility: HOSPITAL | Age: 80
End: 2020-12-21
Attending: ORTHOPAEDIC SURGERY
Payer: MEDICARE

## 2020-12-21 DIAGNOSIS — M89.9 SCAPULAR DYSFUNCTION: ICD-10-CM

## 2020-12-21 DIAGNOSIS — M62.81 MUSCLE WEAKNESS: ICD-10-CM

## 2020-12-21 PROCEDURE — 97110 THERAPEUTIC EXERCISES: CPT | Mod: PN,CQ

## 2020-12-21 NOTE — PROGRESS NOTES
Physical Therapy Treatment Note     Name: Cesar Eubanks  Clinic Number: [id number]    Therapy Diagnosis:   Encounter Diagnoses   Name Primary?    Scapular dysfunction     Muscle weakness      Physician: Dmitry Taylor Jr., *    Visit Date: 12/21/2020    Physician Orders: PT Eval and Treat   Medical Diagnosis from Referral: M25.511,G89.29,M25.512 (ICD-10-CM) - Chronic pain of both shoulders   Evaluation Date: 11/12/2020  Authorization Period Expiration: 1/12/2020  Plan of Care Expiration: 11/12/2020 to 1/12/2021  Visit # / Visits authorized: 8/ 12  FOTO#: 8/10    Time In: 15:15 pm (patient arrived 15 minutes late)  Time Out: 15:45 pm  Total Billable Time:  30 minutes (2TE)    Precautions: Standard    Subjective     Pt reports: Pt reports feeling better after last treatment.  He was compliant with home exercise program.  Response to previous treatment:    Functional change: none noted    Pain: 1/10  Location: bilateral shoulder      Objective     Cesar received therapeutic exercises to develop strength, endurance, ROM, flexibility and posture for 40 minutes including:    UBE - 5min level 2 (not today)  Serratus punches: 3x12 with green bar (4lb)  AA Scaption c PT assisting upward rotation of scapular standing facing mirror 2x1min  Supine wand flexion: 3x10 with green wand (4lb)   Seated ER B (no monies) - 3x12 c YTB  Seated horizontal Abd no resistance - 3x1min - pt cued to drop shoulder, shoulder abduct, extend elbows forward, then perform horizontal abduction   Prone Y: 3x12  Prone T: 3x12   Levator scap stretch - [id number] R  Upper Trap Stretch - to L - R ended up causing tingling - [id number] (changed to levator stretch)  Standing Row - 3x12(not today)  Thoracic mobilization over foam - x1min  Supine ER c 2lb - 3x12    Sidelying ER: 2x10 B (progressed to sitting - not today)  Prone Row: 3x12 (not today)  Sidelying ABD: 2x10 B (not today)  Seated thoracic extension: x15 - NOT TODAY  Scapular retraction:  "2x10 (not today)  B ER c RTB: 2x10 ( change to no resistance as pt was unable to get full motion)  Standing AAROM shoulder ABD with blue wand: 2x10 B - NOT TODAY  Serratus wall slides: 2x8 - NOT TODAY  Doorway Pec stretch: 3x15" - NOT TODAY    Cesar received the following manual therapy techniques: Joint mobilizations, Myofacial release, Soft tissue Mobilization and Friction Massage were applied to the: Shoulders for 00 minutes, including:    T1-T5 mobilization Grade 2-3 Not today  AP glide GHJ grade 2-3 (not today)  Sidelying scapular ant elevation/post depression PNF - start with passive then active then resisted. Not today  Manual Pec stretch supine (not today)        Home Exercises Provided and Patient Education Provided     Education provided:   - Cont HEP - check posture and work on bringing shoulders down.   - Pt educated on the rotator cuff and how it functions to move the scapula into the proper positioning when reaching. Pt educated on time frame of healing and avoiding aggravating activities that may hinder healing.     Written Home Exercises Provided: Patient instructed to cont prior HEP.  Exercises were reviewed and Cesar was able to demonstrate them prior to the end of the session.  Cesar demonstrated good  understanding of the education provided.     See EMR under Patient Instructions for exercises provided 11/13/2020.    Assessment     Pt continue to have B shoulder dysfunction R>L. Patient presented to clinic with 1/10 pain. Paitent states that he has felt some improvement lately with his ROM and strength. Prone exercises remain very difficult for patient. Patient reported no increase in pain following therapy today.     Cesar is progressing well towards his goals.   Pt prognosis is Good.     Pt will continue to benefit from skilled outpatient physical therapy to address the deficits listed in the problem list box on initial evaluation, provide pt/family education and to maximize pt's level of " independence in the home and community environment.     Pt's spiritual, cultural and educational needs considered and pt agreeable to plan of care and goals.     Anticipated barriers to physical therapy: Chronicity of shoulder pain     Goals:  Short Term Goals (4 Weeks):   1) 1. Pt will be compliant with initial HEP to supplement PT in decreasing pain with functional mobility. PROGRESSING, NOT MET  2) Pt will improve motion of the shoulder and will demonstrated decreased shoulder hike with end range shoulder abd on the R PROGRESSING, NOT MET  3) Pt will improve pain to a score of 4/10 on VAS in order to do wood working PROGRESSING, NOT MET  4) Pt will be able to sleep for >5hours without being woken up by pain in order to get enough rest PROGRESSING, NOT MET    Long Term Goals (8 Weeks):  1) Pt will be compliant with final HEP to reduce risk of further injury after dc PROGRESSING, NOT MET  2) Pt will improve FOTO to a score of 34% indicating overall improvement in function PROGRESSING, NOT MET  3) Pt will improve pain to a level of 2/10 in order to do wood working PROGRESSING, NOT MET  4) Pt will be able to sleep >7hours without being woken up by pain in order to get enough rest. PROGRESSING, NOT MET    Plan     Continue to work on scapulo-humeral rhythm and motor control. Check on new onset of levator discomfort.   Shruti Grant, PTA

## 2020-12-29 ENCOUNTER — DOCUMENTATION ONLY (OUTPATIENT)
Dept: REHABILITATION | Facility: HOSPITAL | Age: 80
End: 2020-12-29

## 2020-12-29 NOTE — PROGRESS NOTES
PT/PTA met face to face to discuss pt's treatment plan and progress towards established goals. Pt will be seen by a physical therapist minimally every 6th visit or every 30 days.    Soco Victor, PT    Larry Carter, ROSAS Curry PTA    Face to Face PTA Conference performed with Soco Victor PT regarding patient's current status, overall progress, and plan of care    Lane QUINTERO Faye  12/30/2020     Shruti Grant, PTA     Gregorio Herrmann, PTA

## 2021-01-05 ENCOUNTER — PATIENT MESSAGE (OUTPATIENT)
Dept: ADMINISTRATIVE | Facility: OTHER | Age: 81
End: 2021-01-05

## 2021-01-07 ENCOUNTER — CLINICAL SUPPORT (OUTPATIENT)
Dept: REHABILITATION | Facility: HOSPITAL | Age: 81
End: 2021-01-07
Attending: ORTHOPAEDIC SURGERY
Payer: MEDICARE

## 2021-01-07 DIAGNOSIS — M89.9 SCAPULAR DYSFUNCTION: ICD-10-CM

## 2021-01-07 DIAGNOSIS — M62.81 MUSCLE WEAKNESS: ICD-10-CM

## 2021-01-07 PROCEDURE — 97110 THERAPEUTIC EXERCISES: CPT | Mod: PN,CQ

## 2021-01-14 ENCOUNTER — CLINICAL SUPPORT (OUTPATIENT)
Dept: REHABILITATION | Facility: HOSPITAL | Age: 81
End: 2021-01-14
Attending: ORTHOPAEDIC SURGERY
Payer: MEDICARE

## 2021-01-14 DIAGNOSIS — M62.81 MUSCLE WEAKNESS: ICD-10-CM

## 2021-01-14 DIAGNOSIS — M89.9 SCAPULAR DYSFUNCTION: ICD-10-CM

## 2021-01-14 PROCEDURE — 97110 THERAPEUTIC EXERCISES: CPT | Mod: PN

## 2021-01-15 ENCOUNTER — PATIENT MESSAGE (OUTPATIENT)
Dept: ADMINISTRATIVE | Facility: OTHER | Age: 81
End: 2021-01-15

## 2021-01-28 ENCOUNTER — CLINICAL SUPPORT (OUTPATIENT)
Dept: REHABILITATION | Facility: HOSPITAL | Age: 81
End: 2021-01-28
Attending: ORTHOPAEDIC SURGERY
Payer: MEDICARE

## 2021-01-28 DIAGNOSIS — M89.9 SCAPULAR DYSFUNCTION: ICD-10-CM

## 2021-01-28 DIAGNOSIS — M62.81 MUSCLE WEAKNESS: ICD-10-CM

## 2021-01-28 PROCEDURE — 97110 THERAPEUTIC EXERCISES: CPT | Mod: PN,CQ

## 2021-02-01 ENCOUNTER — CLINICAL SUPPORT (OUTPATIENT)
Dept: REHABILITATION | Facility: HOSPITAL | Age: 81
End: 2021-02-01
Attending: ORTHOPAEDIC SURGERY
Payer: MEDICARE

## 2021-02-01 DIAGNOSIS — M62.81 MUSCLE WEAKNESS: ICD-10-CM

## 2021-02-01 DIAGNOSIS — M89.9 SCAPULAR DYSFUNCTION: ICD-10-CM

## 2021-02-01 PROCEDURE — 97110 THERAPEUTIC EXERCISES: CPT | Mod: PN,CQ

## 2021-02-03 ENCOUNTER — DOCUMENTATION ONLY (OUTPATIENT)
Dept: REHABILITATION | Facility: HOSPITAL | Age: 81
End: 2021-02-03

## 2021-02-04 ENCOUNTER — CLINICAL SUPPORT (OUTPATIENT)
Dept: REHABILITATION | Facility: HOSPITAL | Age: 81
End: 2021-02-04
Attending: ORTHOPAEDIC SURGERY
Payer: MEDICARE

## 2021-02-04 DIAGNOSIS — M62.81 MUSCLE WEAKNESS: ICD-10-CM

## 2021-02-04 DIAGNOSIS — M89.9 SCAPULAR DYSFUNCTION: ICD-10-CM

## 2021-02-04 PROCEDURE — 97110 THERAPEUTIC EXERCISES: CPT | Mod: PN,CQ

## 2021-02-11 ENCOUNTER — CLINICAL SUPPORT (OUTPATIENT)
Dept: REHABILITATION | Facility: HOSPITAL | Age: 81
End: 2021-02-11
Attending: ORTHOPAEDIC SURGERY
Payer: MEDICARE

## 2021-02-11 DIAGNOSIS — M62.81 MUSCLE WEAKNESS: ICD-10-CM

## 2021-02-11 DIAGNOSIS — M89.9 SCAPULAR DYSFUNCTION: ICD-10-CM

## 2021-02-11 PROCEDURE — 97110 THERAPEUTIC EXERCISES: CPT | Mod: PN

## 2021-02-25 PROBLEM — M35.3 POLYMYALGIA RHEUMATICA: Status: ACTIVE | Noted: 2021-02-25

## 2023-02-20 ENCOUNTER — HOSPITAL ENCOUNTER (OUTPATIENT)
Dept: CARDIOLOGY | Facility: HOSPITAL | Age: 83
Discharge: HOME | End: 2023-02-20
Attending: INTERNAL MEDICINE
Payer: MEDICARE

## 2023-02-20 VITALS — HEIGHT: 66 IN | BODY MASS INDEX: 33.27 KG/M2 | WEIGHT: 207 LBS

## 2023-02-20 DIAGNOSIS — R60.0 PEDAL EDEMA: ICD-10-CM

## 2023-02-20 PROCEDURE — 93306 TTE W/DOPPLER COMPLETE: CPT | Mod: 26,,, | Performed by: INTERNAL MEDICINE

## 2023-02-20 PROCEDURE — 93306 ECHO (CUPID ONLY): ICD-10-PCS | Mod: 26,,, | Performed by: INTERNAL MEDICINE

## 2023-02-20 PROCEDURE — 93306 TTE W/DOPPLER COMPLETE: CPT

## 2023-02-22 LAB
AORTIC ROOT ANNULUS: 3.71 CM
AORTIC VALVE CUSP SEPERATION: 2.08 CM
AV INDEX (PROSTH): 0.9
AV MEAN GRADIENT: 5 MMHG
AV PEAK GRADIENT: 9 MMHG
AV REGURGITATION PRESSURE HALF TIME: 448.59 MS
AV VALVE AREA: 2.94 CM2
AV VELOCITY RATIO: 0.75
BSA FOR ECHO PROCEDURE: 2.09 M2
CV ECHO LV RWT: 0.52 CM
DOP CALC AO PEAK VEL: 1.46 M/S
DOP CALC AO VTI: 27.9 CM
DOP CALC LVOT AREA: 3.3 CM2
DOP CALC LVOT DIAMETER: 2.04 CM
DOP CALC LVOT PEAK VEL: 1.1 M/S
DOP CALC LVOT STROKE VOLUME: 82 CM3
DOP CALC MV VTI: 27.7 CM
DOP CALCLVOT PEAK VEL VTI: 25.1 CM
E WAVE DECELERATION TIME: 360.58 MSEC
E/A RATIO: 0.61
E/E' RATIO: 10 M/S
ECHO LV POSTERIOR WALL: 1.15 CM (ref 0.6–1.1)
EJECTION FRACTION: 65 %
FRACTIONAL SHORTENING: 34 % (ref 28–44)
INTERVENTRICULAR SEPTUM: 1.54 CM (ref 0.6–1.1)
IVRT: 94.2 MSEC
LA MAJOR: 4.99 CM
LA MINOR: 5.07 CM
LEFT ATRIUM SIZE: 3.94 CM
LEFT ATRIUM VOLUME INDEX MOD: 21.7 ML/M2
LEFT ATRIUM VOLUME MOD: 44.04 CM3
LEFT INTERNAL DIMENSION IN SYSTOLE: 2.94 CM (ref 2.1–4)
LEFT VENTRICLE DIASTOLIC VOLUME INDEX: 44.07 ML/M2
LEFT VENTRICLE DIASTOLIC VOLUME: 89.46 ML
LEFT VENTRICLE MASS INDEX: 113 G/M2
LEFT VENTRICLE SYSTOLIC VOLUME INDEX: 16.5 ML/M2
LEFT VENTRICLE SYSTOLIC VOLUME: 33.43 ML
LEFT VENTRICULAR INTERNAL DIMENSION IN DIASTOLE: 4.44 CM (ref 3.5–6)
LEFT VENTRICULAR MASS: 229.35 G
LV LATERAL E/E' RATIO: 9.17 M/S
LV SEPTAL E/E' RATIO: 11 M/S
LVOT MG: 2.56 MMHG
LVOT MV: 0.73 CM/S
MV MEAN GRADIENT: 1 MMHG
MV PEAK A VEL: 0.9 M/S
MV PEAK E VEL: 0.55 M/S
MV PEAK GRADIENT: 4 MMHG
MV STENOSIS PRESSURE HALF TIME: 104.57 MS
MV VALVE AREA BY CONTINUITY EQUATION: 2.96 CM2
MV VALVE AREA P 1/2 METHOD: 2.1 CM2
PISA AR MAX VEL: 3.84 M/S
PISA TR MAX VEL: 2.54 M/S
PULM VEIN S/D RATIO: 2.09
PV MV: 0.77 M/S
PV PEAK D VEL: 0.23 M/S
PV PEAK S VEL: 0.48 M/S
PV PEAK VELOCITY: 1.15 CM/S
RA MAJOR: 5.96 CM
RA WIDTH: 2.58 CM
RIGHT VENTRICULAR END-DIASTOLIC DIMENSION: 2.75 CM
SINUS: 3.9 CM
TDI LATERAL: 0.06 M/S
TDI SEPTAL: 0.05 M/S
TDI: 0.06 M/S
TR MAX PG: 26 MMHG

## 2023-04-30 DIAGNOSIS — N40.1 BENIGN PROSTATIC HYPERPLASIA WITH LOWER URINARY TRACT SYMPTOMS: ICD-10-CM

## 2023-05-01 RX ORDER — TAMSULOSIN HYDROCHLORIDE 0.4 MG/1
CAPSULE ORAL
Qty: 90 CAPSULE | Refills: 3 | Status: SHIPPED | OUTPATIENT
Start: 2023-05-01

## 2023-06-12 ENCOUNTER — CLINICAL SUPPORT (OUTPATIENT)
Dept: LAB | Facility: HOSPITAL | Age: 83
End: 2023-06-12
Attending: INTERNAL MEDICINE
Payer: MEDICARE

## 2023-06-12 DIAGNOSIS — R42 LIGHTHEADEDNESS: ICD-10-CM

## 2023-06-12 PROCEDURE — 93010 EKG 12-LEAD: ICD-10-PCS | Mod: ,,, | Performed by: INTERNAL MEDICINE

## 2023-06-12 PROCEDURE — 93010 ELECTROCARDIOGRAM REPORT: CPT | Mod: ,,, | Performed by: INTERNAL MEDICINE

## 2023-06-12 PROCEDURE — 93005 ELECTROCARDIOGRAM TRACING: CPT

## 2023-08-02 NOTE — PROGRESS NOTES
"  Physical Therapy Daily Treatment Note/ Discharge      [facility]  [id number]    Therapy Diagnosis:   Encounter Diagnoses   Name Primary?    Cervical pain     Weakness     Cervical radiculopathy      Physician: Alfa Chase MD    Visit Date: 11/21/2019  Physician Orders: PT Eval and Treat   Medical Diagnosis: M54.12 (ICD-10-CM) - Cervical radiculopathy  Date of Surgery: NA     Evaluation Date: 10/14/2019  Authorization Period Expiration: 12/11/2019  Plan of Care Certification Period: 11/30/2019  Visit # / Visits authorized: 11/ 12  FOTO:  10/10  DONE  GCode: 10/10  Visit amt:    60.64  Total Amt.: 898.23     Time In: 1000  Time Out: 1050  Total Billable Time: 35 minutes (2TE)     Precautions: HTN    Subjective     Pt reports: Wants to take a break from therapy and will work hard towards completing HEP daily.   He was compliant with home exercise program.  Response to previous treatment: tolerated well  Functional change: Less issues with itching, pins and needles.    Pain: 2/10  Location: left UE and neck.     Objective     Cesar received therapeutic exercises to develop strength, endurance, ROM, flexibility, posture and core stabilization for 35 minutes including:     Supine:   - Serratus press up  2x10 with 4# dowel  - Pec stretch 3x30"  - Ulnar nerve glide 3x10  - Cervical retraction 3x10, 5" supine  - Scalene stretch 30"x3 B   - Open book 3x10, 3" R only   - Cervical retraction seated:  5" hold, 2x10  - Wall slides 3x10  - No money 2x12, 5" RTB  -Trunk Rotation standing using RTB 2x12 -OOT  - Standing rows:  2x15 RTB - OOT  - Standing lat pulls:  2x15 RTB - OOT      Cervical Range of Motion:     Degrees Pain   Flexion WNL        Extension 50 -      Right Rotation 50        Left Rotation 50        Right Side Bending 30     Left Side Bending 30 +      Shoulder Range of Motion:   Shoulder Left Right   Flexion 180 180   Abduction 180 180   ER 90 75   IR T12 L4         Upper " elenaom regarding approval of Sax[place] Extremity Strength  (R) UE   (L) UE     Shoulder flexion: 4/5 Shoulder flexion: 5/5   Shoulder Abduction: 4/5 Shoulder abduction: 5/5   Shoulder ER 4+/5 Shoulder ER 5/5   Shoulder IR 4+/5 Shoulder IR 5/5   Elbow flexion: 4/5 Elbow flexion: 5/5   Elbow extension: 4/5 Elbow extension: 5/5   Wrist flexion: 5/5 Wrist flexion: 5/5   Wrist extension: 5/5 Wrist extension: 5/5            Home Exercises Provided and Patient Education Provided     Education provided:    - HEP stretch and nerve glide technique  - Given RTB and instructed in use for HEP    Written Home Exercises Provided: Patient instructed to cont prior HEP.  Exercises were reviewed and Cesar was able to demonstrate them prior to the end of the session.  Cesar demonstrated good  understanding of the education provided.     See EMR under Patient Instructions for exercises provided 11/21/2019    Assessment     Since beginning PT, pt has been seen 11 times since initial evaluation on 10/14/19. Overall, he has made good, steady progress with his PT treatments and has worked hard towards all of his PT goals as evidenced by subjective and objective improvements. Since attending PT he has reached most of his PT goals. He will be discharged with an updated HEP and was instructed to contact us with any other questions or concerns. Pt agreeable to d/c.      Goals:   Short Term Goals (4 Weeks):  - Pt will increase cervical ROM to >50% of WNL in all planes for improved tolerance with driving activity   (Good Progress, NOT MET)  - Pt will increase RUE strength to > 4/5 for ease with ADL's   ( MET)  - Decrease pain/sensation symptoms by 50% for improved tolerance to ADL's    (MET)  - Pt independent with HEP to improve tolerance to exercise progressions.  (MET)       Long Term Goals (6 Weeks):  - Pt will increase cervical ROM to 75% of WNL and RUE to match the contralateral side for return to prior activity level   (PROGRESSING, NOT MET)  - Pt will increase RUE strength to  match the contralateral side.   (Good Progress, NOT MET)  - Decrease symptoms >75% for return to prior activity level    (PROGRESSING, NOT MET)  - Pt will report improvement in overall functional abilities, evidenced by improved score on  FOTO to 40% limitation or better.  (MET)      Plan     Pt D/C'd     Car Betancourt, PT

## 2024-01-10 DIAGNOSIS — M25.511 RIGHT SHOULDER PAIN, UNSPECIFIED CHRONICITY: Primary | ICD-10-CM

## 2024-01-11 ENCOUNTER — HOSPITAL ENCOUNTER (OUTPATIENT)
Dept: RADIOLOGY | Facility: HOSPITAL | Age: 84
Discharge: HOME | End: 2024-01-11
Attending: PHYSICIAN ASSISTANT
Payer: MEDICARE

## 2024-01-11 ENCOUNTER — OFFICE VISIT (OUTPATIENT)
Dept: ORTHOPEDICS | Facility: CLINIC | Age: 84
End: 2024-01-11
Payer: MEDICARE

## 2024-01-11 VITALS
DIASTOLIC BLOOD PRESSURE: 79 MMHG | SYSTOLIC BLOOD PRESSURE: 140 MMHG | HEART RATE: 76 BPM | HEIGHT: 66 IN | BODY MASS INDEX: 32.24 KG/M2 | WEIGHT: 200.63 LBS

## 2024-01-11 DIAGNOSIS — M25.511 RIGHT SHOULDER PAIN, UNSPECIFIED CHRONICITY: ICD-10-CM

## 2024-01-11 DIAGNOSIS — M19.011 ARTHRITIS OF RIGHT GLENOHUMERAL JOINT: Primary | ICD-10-CM

## 2024-01-11 DIAGNOSIS — M25.411 SHOULDER EFFUSION, RIGHT: ICD-10-CM

## 2024-01-11 PROCEDURE — 99203 OFFICE O/P NEW LOW 30 MIN: CPT | Mod: S$GLB,,, | Performed by: PHYSICIAN ASSISTANT

## 2024-01-11 PROCEDURE — 73030 X-RAY EXAM OF SHOULDER: CPT | Mod: 26,RT,, | Performed by: RADIOLOGY

## 2024-01-11 PROCEDURE — 99999 PR PBB SHADOW E&M-EST. PATIENT-LVL IV: CPT | Mod: PBBFAC,,, | Performed by: PHYSICIAN ASSISTANT

## 2024-01-11 PROCEDURE — 73030 X-RAY EXAM OF SHOULDER: CPT | Mod: TC,PN,RT

## 2024-01-11 NOTE — PROGRESS NOTES
"Subjective:      Patient ID: Cesar Eubanks is a 83 y.o. male.    Chief Complaint: Pain (Right shoulder pain)      84yo RHD male presents with two week history of right shoulder nonpainful "lump." Has chronic right shoulder pain 2/2 arthritis. Worse with fast motions and overhead. Notes weakness in shoulder. Denies paresthesias and neck pain. Taking tylenol, diclofenac and tramadol for pain. Sees rheumatologist for his arthritis. Had previous shoulder injections with about 20%relief.         Review of Systems   Constitutional: Negative for chills and fever.   Cardiovascular:  Negative for chest pain.   Respiratory:  Negative for cough.    Hematologic/Lymphatic: Does not bruise/bleed easily.   Skin:  Negative for poor wound healing and rash.   Musculoskeletal:  Positive for arthritis, joint pain, myalgias and stiffness.   Gastrointestinal:  Negative for abdominal pain.   Genitourinary:  Negative for bladder incontinence.   Neurological:  Negative for dizziness, loss of balance and weakness.   Psychiatric/Behavioral:  Negative for altered mental status.        Review of patient's allergies indicates:   Allergen Reactions    Penicillins         Current Outpatient Medications   Medication Sig Dispense Refill    allopurinoL (ZYLOPRIM) 300 MG tablet Take 1 tablet (300 mg total) by mouth once daily. 90 tablet 3    cetirizine (ZYRTEC) 5 MG chewable tablet Take 5 mg by mouth once daily.      colchicine (MITIGARE) 0.6 mg Cap Take two pills at the onset of gout attack, then 1 pill two hours later.  Then take 1 pill twice a day as needed for continued gout pain. 20 capsule 4    cyclobenzaprine (FLEXERIL) 10 MG tablet take ONE-HALF TO 1 TABLET BY MOUTH one AND a half HOURS BEFORE bed AS NEEDED FOR muscle pain      diclofenac (VOLTAREN) 75 MG EC tablet TAKE ONE TABLET BY MOUTH TWICE DAILY 60 tablet 3    fluticasone propionate (FLONASE) 50 mcg/actuation nasal spray 1 spray in each nostril      furosemide (LASIX) 20 MG " "tablet Take 1 tablet (20 mg total) by mouth once daily. 90 tablet 3    lisinopriL-hydrochlorothiazide (PRINZIDE,ZESTORETIC) 20-25 mg Tab TAKE ONE TABLET BY MOUTH EVERY DAY FOR 90 DAYS 90 tablet 4    mupirocin (BACTROBAN) 2 % ointment APPLY TOPICALLY THREE TIMES DAILY 44 g 0    NIFEdipine (PROCARDIA-XL) 60 MG (OSM) 24 hr tablet TAKE ONE TABLET BY MOUTH ONCE DAILY 90 tablet 3    tamsulosin (FLOMAX) 0.4 mg Cap TAKE ONE CAPSULE BY MOUTH EVERY DAY 90 capsule 3    traMADoL (ULTRAM) 50 mg tablet TAKE ONE TABLET BY MOUTH EVERY 8 HOURS AS NEEDED FOR PAIN 21 tablet 2    zolpidem (AMBIEN) 10 mg Tab TAKE ONE TABLET BY MOUTH NIGHTLY AS NEEDED 30 tablet 5     No current facility-administered medications for this visit.        The patient's relevant past medical, surgical, and social history was reviewed in Epic.       Objective:      VITAL SIGNS: BP (!) 140/79   Pulse 76   Ht 5' 5.98" (1.676 m)   Wt 91 kg (200 lb 9.9 oz)   BMI 32.40 kg/m²     General    Nursing note and vitals reviewed.  Constitutional: He is oriented to person, place, and time. He appears well-developed and well-nourished.   Neurological: He is alert and oriented to person, place, and time.         Right Shoulder Exam     Inspection/Observation   Swelling: present    Tenderness   The patient is tender to palpation of the biceps tendon (glenohumeral joint).    Range of Motion   Active abduction:  170   Forward Flexion:  180   External Rotation 0 degrees:  80   Internal rotation 0 degrees:  L3     Tests & Signs   Drop arm: negative  Wren test: negative  Impingement: negative  Rotator Cuff Painful Arc/Range: mild  Speed's Test: positive    Other   Sensation: normal    Comments:  Ballotable area of swelling more anterior to the deltoid but not directly over the glenohumeral joint. Could be subacromial effusion.   Non tender. No erythema     Muscle Strength   Right Upper Extremity   Shoulder Abduction: 4/5   Shoulder Internal Rotation: 4/5   Shoulder External " Rotation: 4/5   Supraspinatus: 4/5   Subscapularis: 4/5   Biceps: 4/5      X-Ray Shoulder Trauma 3 view Right  Narrative: EXAMINATION:  XR SHOULDER TRAUMA 3 VIEW RIGHT    CLINICAL HISTORY:  Pain in right shoulder    TECHNIQUE:  Three or four views of the right shoulder were performed.    COMPARISON:  Right shoulder radiograph dated 10/15/2020    FINDINGS:  Glenohumeral and AC joint articulations appear intact.  Significant DJD with moderate glenohumeral joint space narrowing.  Some degree of notching at superolateral humeral head, possible sequela of remote Hill-Sachs.  To correlate with history.  No acute fracture, dislocation, or osseous destruction.  Impression: As above.    Electronically signed by: Eric Treadwell  Date:    01/11/2024  Time:    15:34    I have reviewed the above radiograph and agree with the findings stated by the radiologist.         Assessment:       1. Arthritis of right glenohumeral joint    2. Shoulder effusion, right          Plan:         Cesar was seen today for pain.    Diagnoses and all orders for this visit:    Arthritis of right glenohumeral joint  -     Ambulatory referral/consult to Interventional RAD; Future    Shoulder effusion, right    Right shoulder bone on bone glenohumeral joint arthritis. The swelling is most likely effusion 2/2 arthritis. Do not recommend drainage today. Instead, will refer him to IR for right shoulder glenohumeral joint Kenalog injection done under fluoro.  This should cause the swelling to decrease. If no decrease in size, consider MRI of the right shoulder.      Diagnoses and plan discussed with the patient, as well as the expected course and duration of his symptoms.  All questions and concerns were addressed prior to the end of the visit.   Instructed patient to call office if they have any future questions/concerns or to schedule apt. Patient will return to see me if symptoms worsen or fail to improve    Note dictated with voice recognition  software, please excuse any grammatical errors.        Angela Barakat PA-C   01/11/2024

## 2024-01-31 ENCOUNTER — TELEPHONE (OUTPATIENT)
Dept: INTERVENTIONAL RADIOLOGY/VASCULAR | Facility: HOSPITAL | Age: 84
End: 2024-01-31
Payer: MEDICARE

## 2024-02-01 ENCOUNTER — HOSPITAL ENCOUNTER (OUTPATIENT)
Dept: INTERVENTIONAL RADIOLOGY/VASCULAR | Facility: HOSPITAL | Age: 84
Discharge: HOME | End: 2024-02-01
Attending: PHYSICIAN ASSISTANT
Payer: MEDICARE

## 2024-02-01 VITALS
OXYGEN SATURATION: 95 % | HEIGHT: 66 IN | BODY MASS INDEX: 32.3 KG/M2 | WEIGHT: 201 LBS | HEART RATE: 81 BPM | TEMPERATURE: 98 F | RESPIRATION RATE: 18 BRPM | SYSTOLIC BLOOD PRESSURE: 147 MMHG | DIASTOLIC BLOOD PRESSURE: 68 MMHG

## 2024-02-01 DIAGNOSIS — M12.811 ROTATOR CUFF ARTHROPATHY, RIGHT: ICD-10-CM

## 2024-02-01 DIAGNOSIS — M19.011 ARTHRITIS OF RIGHT GLENOHUMERAL JOINT: ICD-10-CM

## 2024-02-01 PROCEDURE — 63600175 PHARM REV CODE 636 W HCPCS: Performed by: STUDENT IN AN ORGANIZED HEALTH CARE EDUCATION/TRAINING PROGRAM

## 2024-02-01 PROCEDURE — 77002 NEEDLE LOCALIZATION BY XRAY: CPT | Mod: TC | Performed by: STUDENT IN AN ORGANIZED HEALTH CARE EDUCATION/TRAINING PROGRAM

## 2024-02-01 PROCEDURE — 20610 DRAIN/INJ JOINT/BURSA W/O US: CPT | Mod: RT,,, | Performed by: STUDENT IN AN ORGANIZED HEALTH CARE EDUCATION/TRAINING PROGRAM

## 2024-02-01 PROCEDURE — 25000003 PHARM REV CODE 250: Performed by: STUDENT IN AN ORGANIZED HEALTH CARE EDUCATION/TRAINING PROGRAM

## 2024-02-01 PROCEDURE — A4215 STERILE NEEDLE: HCPCS

## 2024-02-01 RX ORDER — LIDOCAINE HYDROCHLORIDE 10 MG/ML
INJECTION INFILTRATION; PERINEURAL
Status: COMPLETED | OUTPATIENT
Start: 2024-02-01 | End: 2024-02-01

## 2024-02-01 RX ORDER — BUPIVACAINE HYDROCHLORIDE AND EPINEPHRINE 2.5; 5 MG/ML; UG/ML
INJECTION, SOLUTION EPIDURAL; INFILTRATION; INTRACAUDAL; PERINEURAL
Status: COMPLETED | OUTPATIENT
Start: 2024-02-01 | End: 2024-02-01

## 2024-02-01 RX ORDER — TRIAMCINOLONE ACETONIDE 40 MG/ML
INJECTION, SUSPENSION INTRA-ARTICULAR; INTRAMUSCULAR
Status: COMPLETED | OUTPATIENT
Start: 2024-02-01 | End: 2024-02-01

## 2024-02-01 RX ADMIN — TRIAMCINOLONE ACETONIDE 40 MG: 40 INJECTION, SUSPENSION INTRA-ARTICULAR; INTRAMUSCULAR at 03:02

## 2024-02-01 RX ADMIN — LIDOCAINE HYDROCHLORIDE 5 ML: 10 INJECTION, SOLUTION INFILTRATION; PERINEURAL at 03:02

## 2024-02-01 RX ADMIN — BUPIVACAINE HYDROCHLORIDE AND EPINEPHRINE BITARTRATE 2 ML: 2.5; .005 INJECTION, SOLUTION EPIDURAL; INFILTRATION; INTRACAUDAL; PERINEURAL at 03:02

## 2024-02-01 NOTE — H&P
H&P Note  Interventional Radiology    Reason for Consult: R shoulder steroid injection    SUBJECTIVE:     Chief Complaint: R shoulder pain    History of Present Illness: 83M with chronic right shoulder pain and OA presenting for steroid injection. Notes mobile, non-painful mass along anterior shoulder x3 weeks. Pain 2-3/10 at rest but can be up to 9-10 with motion.     Past Medical History:   Diagnosis Date    Arthritis     Hernia 6/2013    Hypertension     Polymyalgia rheumatica      Past Surgical History:   Procedure Laterality Date    APPENDECTOMY      CHOLECYSTECTOMY  10/17/12    COLONOSCOPY      HERNIA REPAIR  07/11/2013    laparoscopic cholecystectomy  10/17/12    PILONIDAL CYST DRAINAGE      TONSILLECTOMY       Family History   Problem Relation Age of Onset    Prostate cancer Neg Hx     Kidney disease Neg Hx     Lung cancer Mother     Clotting disorder Father      Social History     Tobacco Use    Smoking status: Never    Smokeless tobacco: Never   Substance Use Topics    Alcohol use: Yes     Alcohol/week: 4.0 standard drinks of alcohol     Types: 2 Glasses of wine, 2 Cans of beer per week    Drug use: No       Review of Systems:  Constitutional/General:No fever, chills, change in appetite or weight loss.  Hematological/Immuno: no known coagulopathies  Respiratory: no shortness of breath  Cardiovascular: no chest pain  Gastrointestinal: no abdominal pain  Genito-Urinary: no dysuria  Musculoskeletal: as above  Skin: Negative for rash, itching, pigmentation changes, nail or hair changes.  Neurological: no TIA or stroke symptoms  Psychiatric: normal mood/affect, good insight/judgement      OBJECTIVE:     Vital Signs Range (Last 24H):  BP: ()/()   Arterial Line BP: ()/()     Physical Exam:  General- Patient AAO x3 in NAD  ENT- EOMI  Neck- No masses  CV- Normal rate  Resp-  No increased WOB  GI- Non tender, non-distended  Extrem- No cyanosis, clubbing, edema; soft, mobile mass along R anterior shoulder  Derm- No  "rashes, masses, or lesions noted  Neuro-  No focal deficits noted    Physical Exam  There is no height or weight on file to calculate BMI.    Scheduled Meds:   Continuous Infusions:   PRN Meds:    Allergies:   Review of patient's allergies indicates:   Allergen Reactions    Penicillins        Labs:  No results for input(s): "INR", "PT", "PTT" in the last 168 hours.  No results for input(s): "WBC", "HGB", "HCT", "MCV", "PLT" in the last 168 hours. No results for input(s): "GLU", "NA", "K", "CL", "CO2", "BUN", "CREATININE", "CALCIUM", "MG", "ALT", "AST", "ALBUMIN", "BILITOT", "BILIDIR" in the last 168 hours.    Vitals (Most Recent):     Consent obtained.     ASSESSMENT/PLAN:     83M with chronic right shoulder pain and OA presenting for steroid injection with local anesthesia.     There are no hospital problems to display for this patient.          Kyung Craft MD    "

## 2024-02-01 NOTE — SEDATION DOCUMENTATION
Patient presents for kenalog/ bupivacaine injection to Right shoulder.  Tolerated well. No monitoring needed post.

## 2024-02-01 NOTE — PROCEDURES
Interventional Radiology Post-Procedure Note    Pre Op Diagnosis: R shoulder steroid injection  Post Op Diagnosis: Same    Procedure: fluoroscopic R shoulder steroid injection    Procedure performed by: Kyung Craft MD    Written Informed Consent Obtained: Yes  Specimen Removed: NO  Estimated Blood Loss: Minimal    Findings:   Successful R shoulder steroid injection with instillation of 1cc 40mg/ml Kenalog + 2cc 0.25% PF bupivicaine into the R glenohumeral joint space.     Patient tolerated procedure well.      Kyung Craft MD  Interventional Radiology

## 2024-02-01 NOTE — DISCHARGE SUMMARY
Radiology Discharge Summary      Hospital Course: No complications    Admit Date: 2/1/2024  Discharge Date: 02/01/2024     Instructions Given to Patient: Yes  Diet: Resume prior diet  Activity: activity as tolerated    Description of Condition on Discharge: Stable  Vital Signs (Most Recent): Temp: 98.2 °F (36.8 °C) (02/01/24 1524)  Pulse: 81 (02/01/24 1524)  Resp: 18 (02/01/24 1524)  BP: (!) 147/68 (02/01/24 1524)  SpO2: 95 % (02/01/24 1524)    Discharge Disposition: Home    Discharge Diagnosis: R shoulder OA     Follow-up: As needed for pain relief    Kyung Craft MD

## 2024-04-15 DIAGNOSIS — N40.1 BENIGN PROSTATIC HYPERPLASIA WITH LOWER URINARY TRACT SYMPTOMS: ICD-10-CM

## 2024-04-15 RX ORDER — TAMSULOSIN HYDROCHLORIDE 0.4 MG/1
CAPSULE ORAL
Qty: 90 CAPSULE | Refills: 3 | Status: SHIPPED | OUTPATIENT
Start: 2024-04-15

## 2024-07-11 ENCOUNTER — TELEPHONE (OUTPATIENT)
Dept: ORTHOPEDICS | Facility: CLINIC | Age: 84
End: 2024-07-11
Payer: MEDICARE

## 2024-07-11 DIAGNOSIS — M19.011 ARTHRITIS OF RIGHT GLENOHUMERAL JOINT: Primary | ICD-10-CM

## 2024-07-11 NOTE — TELEPHONE ENCOUNTER
Spoke with patient.  AAKASH Barakat will put in order for IR shoulder injection.  Appointment canceled today. Patient verbalized understanding.

## 2024-07-15 ENCOUNTER — TELEPHONE (OUTPATIENT)
Dept: INTERVENTIONAL RADIOLOGY/VASCULAR | Facility: CLINIC | Age: 84
End: 2024-07-15
Payer: MEDICARE

## 2024-07-15 DIAGNOSIS — M19.011 ARTHRITIS OF RIGHT GLENOHUMERAL JOINT: Primary | ICD-10-CM

## 2024-07-23 ENCOUNTER — TELEPHONE (OUTPATIENT)
Dept: INTERVENTIONAL RADIOLOGY/VASCULAR | Facility: HOSPITAL | Age: 84
End: 2024-07-23
Payer: MEDICARE

## 2024-07-23 NOTE — NURSING
Advised to arrive at 2:30, where to check in, no need to fast, may drive self, take medications as usual, bring list of current home meds. Confirmed one allergy and no blood thinner use.

## 2024-07-24 ENCOUNTER — HOSPITAL ENCOUNTER (OUTPATIENT)
Dept: INTERVENTIONAL RADIOLOGY/VASCULAR | Facility: HOSPITAL | Age: 84
Discharge: HOME | End: 2024-07-24
Attending: FAMILY MEDICINE
Payer: MEDICARE

## 2024-07-24 VITALS
RESPIRATION RATE: 15 BRPM | OXYGEN SATURATION: 95 % | HEART RATE: 71 BPM | BODY MASS INDEX: 32.14 KG/M2 | SYSTOLIC BLOOD PRESSURE: 133 MMHG | WEIGHT: 200 LBS | HEIGHT: 66 IN | TEMPERATURE: 98 F | DIASTOLIC BLOOD PRESSURE: 72 MMHG

## 2024-07-24 DIAGNOSIS — M19.011 ARTHRITIS OF RIGHT GLENOHUMERAL JOINT: ICD-10-CM

## 2024-07-24 PROCEDURE — 63600175 PHARM REV CODE 636 W HCPCS: Performed by: RADIOLOGY

## 2024-07-24 PROCEDURE — 25000003 PHARM REV CODE 250: Performed by: RADIOLOGY

## 2024-07-24 PROCEDURE — 25500020 PHARM REV CODE 255: Performed by: RADIOLOGY

## 2024-07-24 RX ORDER — LIDOCAINE HYDROCHLORIDE 10 MG/ML
INJECTION INFILTRATION; PERINEURAL
Status: COMPLETED | OUTPATIENT
Start: 2024-07-24 | End: 2024-07-24

## 2024-07-24 RX ORDER — BUPIVACAINE HYDROCHLORIDE 2.5 MG/ML
INJECTION, SOLUTION EPIDURAL; INFILTRATION; INTRACAUDAL
Status: COMPLETED | OUTPATIENT
Start: 2024-07-24 | End: 2024-07-24

## 2024-07-24 RX ORDER — TRIAMCINOLONE ACETONIDE 40 MG/ML
INJECTION, SUSPENSION INTRA-ARTICULAR; INTRAMUSCULAR
Status: COMPLETED | OUTPATIENT
Start: 2024-07-24 | End: 2024-07-24

## 2024-07-24 RX ADMIN — IOHEXOL 1 ML: 300 INJECTION, SOLUTION INTRAVENOUS at 03:07

## 2024-07-24 RX ADMIN — TRIAMCINOLONE ACETONIDE 40 MG: 40 INJECTION, SUSPENSION INTRA-ARTICULAR; INTRAMUSCULAR at 03:07

## 2024-07-24 RX ADMIN — BUPIVACAINE HYDROCHLORIDE 2 ML: 2.5 INJECTION, SOLUTION EPIDURAL; INFILTRATION; INTRACAUDAL; PERINEURAL at 03:07

## 2024-07-24 RX ADMIN — LIDOCAINE HYDROCHLORIDE 5 ML: 10 INJECTION, SOLUTION INFILTRATION; PERINEURAL at 03:07

## 2024-07-24 NOTE — SEDATION DOCUMENTATION
Procedure complete. Patient alert and oriented x4 unlabored on Room Air. Patient denies pain and is resting comfortably. Surgical site dressed with bandaid. Dressing is clean, dry, and intact. Patient transported to recovery.

## 2024-07-24 NOTE — PLAN OF CARE
Pt VSS and in NAD, CRM equipment removed. Discharge instructions and AVS provided. Pt verbalized understanding, questions and concerns addressed. No further needs at this time. Pt discharged from recovery area at 1518.

## 2024-07-24 NOTE — H&P
Radiology History & Physical      SUBJECTIVE:     Chief Complaint: R shoulder pain    History of Present Illness:  Cesar Eubanks is a 84 y.o. male with right shoulder pain who presents for right shoulder injection.    Past Medical History:   Diagnosis Date    Arthritis     Hernia 6/2013    Hypertension     Polymyalgia rheumatica      Past Surgical History:   Procedure Laterality Date    APPENDECTOMY      CHOLECYSTECTOMY  10/17/12    COLONOSCOPY      HERNIA REPAIR  07/11/2013    laparoscopic cholecystectomy  10/17/12    PILONIDAL CYST DRAINAGE      TONSILLECTOMY         Home Meds:   Prior to Admission medications    Medication Sig Start Date End Date Taking? Authorizing Provider   cetirizine (ZYRTEC) 5 MG chewable tablet Take 5 mg by mouth once daily.   Yes Provider, Historical   colchicine (MITIGARE) 0.6 mg Cap Take two pills at the onset of gout attack, then 1 pill two hours later.  Then take 1 pill twice a day as needed for continued gout pain. 7/10/23  Yes Alfa Chase MD   cyclobenzaprine (FLEXERIL) 10 MG tablet take ONE-HALF TO 1 TABLET BY MOUTH one AND a half HOURS BEFORE bed AS NEEDED FOR muscle pain 12/6/21  Yes Provider, Historical   diclofenac (VOLTAREN) 75 MG EC tablet TAKE ONE TABLET BY MOUTH TWICE DAILY 4/23/24  Yes Alfa Chase MD   fluticasone propionate (FLONASE) 50 mcg/actuation nasal spray 1 spray in each nostril   Yes Provider, Historical   lisinopriL-hydrochlorothiazide (PRINZIDE,ZESTORETIC) 20-25 mg Tab TAKE ONE TABLET BY MOUTH EVERY DAY FOR 90 DAYS 7/25/23  Yes Alfa Chase MD   NIFEdipine (PROCARDIA-XL) 60 MG (OSM) 24 hr tablet TAKE ONE TABLET BY MOUTH ONCE DAILY 10/23/23  Yes Alfa Chase MD   tamsulosin (FLOMAX) 0.4 mg Cap TAKE ONE CAPSULE BY MOUTH EVERY DAY 4/15/24  Yes Alfa Chase MD   traMADoL (ULTRAM) 50 mg tablet Take 1 tablet (50 mg total) by mouth every 8 (eight) hours as needed for Pain. 4/26/24  Yes Alfa Chase MD   zolpidem (AMBIEN) 10 mg Tab  TAKE ONE TABLET BY MOUTH NIGHTLY AS NEEDED 2/12/24  Yes Alfa Chase MD   furosemide (LASIX) 20 MG tablet Take 1 tablet (20 mg total) by mouth once daily. 1/30/23   Alfa Chase MD   mupirocin (BACTROBAN) 2 % ointment APPLY TOPICALLY THREE TIMES DAILY 7/6/23   Alfa Chase MD     Anticoagulants/Antiplatelets: no anticoagulation    Allergies:   Review of patient's allergies indicates:   Allergen Reactions    Penicillins      Sedation History:  no adverse reactions    Review of Systems:   Hematological: no known coagulopathies  Respiratory: no shortness of breath  Cardiovascular: no chest pain  Gastrointestinal: no abdominal pain  Genito-Urinary: no dysuria  Musculoskeletal: negative  Neurological: no TIA or stroke symptoms         OBJECTIVE:     Vital Signs (Most Recent)  Temp: 98.4 °F (36.9 °C) (07/24/24 1439)  Pulse: 75 (07/24/24 1439)  Resp: 16 (07/24/24 1439)  BP: (!) 149/74 (07/24/24 1442)  SpO2: 95 % (07/24/24 1439)    Physical Exam:  ASA: n/a  Mallampati: n/a    General: no acute distress  Mental Status: alert and oriented to person, place and time  HEENT: normocephalic, atraumatic  Chest: unlabored breathing  Heart: regular heart rate  Abdomen: nondistended  Extremity: moves all extremities    Laboratory  Lab Results   Component Value Date    INR 1.0 07/02/2013       Lab Results   Component Value Date    WBC 8.0 06/14/2023    HGB 14.1 06/14/2023    HCT 42.4 06/14/2023    MCV 97.7 (H) 06/14/2023     06/14/2023      Lab Results   Component Value Date    GLU 94 06/14/2023     06/14/2023    K 4.3 06/14/2023     06/14/2023    CO2 26 06/14/2023    BUN 29.0 (H) 06/14/2023    CREATININE 1.17 06/14/2023    CALCIUM 9.6 06/14/2023    MG 2.7 (H) 10/18/2012    ALT 22 06/14/2023    AST 26 06/14/2023    ALBUMIN 4.5 06/14/2023    BILITOT 0.7 06/14/2023    BILIDIR 0.6 (H) 10/15/2012       ASSESSMENT/PLAN:     Sedation Plan: Local only.  Patient will undergo right shoulder injection.    Lars  MD Arthur  Diagnostic and Interventional Radiologist  Department of Radiology  Pager: [phone]

## 2024-07-24 NOTE — NURSING
Patient is discharged from IR. AVS is printed and reviewed. Upcoming appointments and the [facility] number is highlighted for convenience. The opportunity to ask questions is provided. Patient is ambulatory from the unit and escorted to the front lobby to exit.

## 2024-07-24 NOTE — SEDATION DOCUMENTATION
Pt arrived to IR suite for right shoulder steroid injection. Pt oriented to unit and staff. Plan of care reviewed with patient, patient verbalizes understanding. Comfort measures utilized. Pt safely transferred from stretcher to procedural table. Fall risk reviewed with patient, fall risk interventions maintained. Safety strap applied, positioner pillows utilized to minimize pressure points. Blankets applied. Pt prepped and draped utilizing standard sterile technique. Patient placed on continuous monitoring, as required by sedation policy. Timeouts completed utilizing standard universal time-out, per department and facility policy. RN to remain at bedside, continuous monitoring maintained. Pt resting comfortably. Denies pain/discomfort. Will continue to monitor. See flow sheets for monitoring, medication administration, and updates.

## 2024-07-24 NOTE — PROCEDURES
Radiology Post-Procedure Note    Pre Op Diagnosis: R shoulder pain  Post Op Diagnosis: Same    Procedure: R shoulder injection    Procedure performed by: Lars Gibson MD    Written Informed Consent Obtained: Yes  Specimen Removed: NO  Estimated Blood Loss: Minimal    Findings:   Successful right shoulder injection.    Patient tolerated procedure well.    Lars Gibson MD  Diagnostic and Interventional Radiologist  Department of Radiology  Pager: [phone]

## 2024-10-08 ENCOUNTER — HOSPITAL ENCOUNTER (OUTPATIENT)
Dept: RADIOLOGY | Facility: HOSPITAL | Age: 84
Discharge: HOME | End: 2024-10-08
Attending: INTERNAL MEDICINE
Payer: MEDICARE

## 2024-10-08 DIAGNOSIS — M48.062 SPINAL STENOSIS OF LUMBAR REGION WITH NEUROGENIC CLAUDICATION: ICD-10-CM

## 2024-10-08 DIAGNOSIS — M54.9 DORSALGIA, UNSPECIFIED: ICD-10-CM

## 2024-10-08 PROBLEM — R29.898 BILATERAL LEG WEAKNESS: Status: ACTIVE | Noted: 2024-10-08

## 2024-10-08 PROBLEM — G95.19 OTHER VASCULAR MYELOPATHIES: Status: ACTIVE | Noted: 2024-10-08

## 2024-10-08 PROBLEM — N39.41 URGE INCONTINENCE OF URINE: Status: ACTIVE | Noted: 2024-10-08

## 2024-10-08 PROCEDURE — 72100 X-RAY EXAM L-S SPINE 2/3 VWS: CPT | Mod: TC,FY

## 2024-10-08 PROCEDURE — 72100 X-RAY EXAM L-S SPINE 2/3 VWS: CPT | Mod: 26,,, | Performed by: RADIOLOGY

## 2024-12-11 ENCOUNTER — TELEPHONE (OUTPATIENT)
Dept: ORTHOPEDICS | Facility: CLINIC | Age: 84
End: 2024-12-11
Payer: MEDICARE

## 2024-12-11 DIAGNOSIS — M19.011 ARTHRITIS OF RIGHT GLENOHUMERAL JOINT: Primary | ICD-10-CM

## 2024-12-11 NOTE — TELEPHONE ENCOUNTER
Spoke with patient.  Informed him that a referral for IR was placed and they will contact him for appointment. MICKY.

## 2024-12-11 NOTE — TELEPHONE ENCOUNTER
----- Message from Robinson sent at 12/11/2024 10:07 AM CST -----  Type:  Sooner Apoointment Request    Caller is requesting a sooner appointment.    Name of Caller:pt   When is the first available appointment?none  Symptoms:injection in right shoulder   Would the patient rather a call back or a response via [facility]? Call   Best Call Back Number: [phone]  Additional Information:

## 2025-01-03 ENCOUNTER — TELEPHONE (OUTPATIENT)
Dept: ORTHOPEDICS | Facility: CLINIC | Age: 85
End: 2025-01-03
Payer: MEDICARE

## 2025-01-03 ENCOUNTER — TELEPHONE (OUTPATIENT)
Dept: INTERVENTIONAL RADIOLOGY/VASCULAR | Facility: CLINIC | Age: 85
End: 2025-01-03
Payer: MEDICARE

## 2025-01-03 NOTE — TELEPHONE ENCOUNTER
"----- Message from Praveen sent at 1/3/2025  8:38 AM CST -----  Pt Requesting Call Back    Who called: pt  Who called for pt:  Best call back #:  [phone]  Add notes: pt said he was supposed to "receive information" about an injection in his shoulder but that no one called him back yet with that in 2 weeks  "

## 2025-01-06 ENCOUNTER — TELEPHONE (OUTPATIENT)
Dept: ORTHOPEDICS | Facility: CLINIC | Age: 85
End: 2025-01-06
Payer: MEDICARE

## 2025-01-06 NOTE — TELEPHONE ENCOUNTER
----- Message from Serene sent at 1/6/2025  9:55 AM CST -----  Regarding: pt called  Name of Who is Calling:Pt         What is the request in detail: Requesting callback in regards to next open injection. Pt states he's been waiting on call from office           Can the clinic reply by [facility]: yes         What Number to Call Back if not in [facility]:Telephone Information:  Mobile          [phone]  
Attempted to call patient to inform him that IR did call him on [address] to schedule appointment and leave number for him to call.  No voicemail.   
will discuss with provider

## 2025-01-10 ENCOUNTER — TELEPHONE (OUTPATIENT)
Dept: ORTHOPEDICS | Facility: CLINIC | Age: 85
End: 2025-01-10
Payer: MEDICARE

## 2025-01-10 NOTE — TELEPHONE ENCOUNTER
"----- Message from Praveen sent at 1/10/2025  9:15 AM CST -----  Pt Requesting Call Back    Who called: pt  Who called for pt:  Best call back #:  [phone]  Add notes: pt said he would like to speak to ZA Barakat regarding him getting an injection in his shoulder for arthritis; says it's "not on the books"  "

## 2025-01-16 ENCOUNTER — TELEPHONE (OUTPATIENT)
Dept: ORTHOPEDICS | Facility: CLINIC | Age: 85
End: 2025-01-16
Payer: MEDICARE

## 2025-01-16 NOTE — TELEPHONE ENCOUNTER
----- Message from Rocky sent at 1/16/2025 11:22 AM CST -----  Type; injection    .  Name of Caller:pt      Would the patient rather a call back or a response via [facility]? call  Best Call Back Number:[phone]  Additional Information: pt is waiting for a phone call to schedule injection,please be advise this is pt 2nd attempt .

## 2025-01-17 DIAGNOSIS — M19.011 ARTHRITIS OF RIGHT GLENOHUMERAL JOINT: Primary | ICD-10-CM

## 2025-01-28 ENCOUNTER — TELEPHONE (OUTPATIENT)
Dept: INTERVENTIONAL RADIOLOGY/VASCULAR | Facility: HOSPITAL | Age: 85
End: 2025-01-28
Payer: MEDICARE

## 2025-01-29 ENCOUNTER — HOSPITAL ENCOUNTER (OUTPATIENT)
Dept: INTERVENTIONAL RADIOLOGY/VASCULAR | Facility: HOSPITAL | Age: 85
Discharge: HOME | End: 2025-01-29
Attending: PHYSICIAN ASSISTANT
Payer: MEDICARE

## 2025-01-29 VITALS
HEART RATE: 94 BPM | BODY MASS INDEX: 31.98 KG/M2 | WEIGHT: 199 LBS | OXYGEN SATURATION: 100 % | TEMPERATURE: 97 F | HEIGHT: 66 IN | DIASTOLIC BLOOD PRESSURE: 88 MMHG | RESPIRATION RATE: 18 BRPM | SYSTOLIC BLOOD PRESSURE: 191 MMHG

## 2025-01-29 DIAGNOSIS — M19.019 ARTHRITIS OF GLENOHUMERAL JOINT: ICD-10-CM

## 2025-01-29 DIAGNOSIS — M19.011 ARTHRITIS OF RIGHT GLENOHUMERAL JOINT: ICD-10-CM

## 2025-01-29 PROCEDURE — 63600175 PHARM REV CODE 636 W HCPCS: Performed by: RADIOLOGY

## 2025-01-29 RX ORDER — BUPIVACAINE HYDROCHLORIDE 2.5 MG/ML
INJECTION, SOLUTION EPIDURAL; INFILTRATION; INTRACAUDAL
Status: COMPLETED | OUTPATIENT
Start: 2025-01-29 | End: 2025-01-29

## 2025-01-29 RX ORDER — TRIAMCINOLONE ACETONIDE 40 MG/ML
INJECTION, SUSPENSION INTRA-ARTICULAR; INTRAMUSCULAR
Status: COMPLETED | OUTPATIENT
Start: 2025-01-29 | End: 2025-01-29

## 2025-01-29 RX ORDER — LIDOCAINE HYDROCHLORIDE 10 MG/ML
INJECTION, SOLUTION INFILTRATION; PERINEURAL
Status: COMPLETED | OUTPATIENT
Start: 2025-01-29 | End: 2025-01-29

## 2025-01-29 RX ADMIN — LIDOCAINE HYDROCHLORIDE 5 ML: 10 INJECTION, SOLUTION INFILTRATION; PERINEURAL at 02:01

## 2025-01-29 RX ADMIN — BUPIVACAINE HYDROCHLORIDE 7 ML: 2.5 INJECTION, SOLUTION EPIDURAL; INFILTRATION; INTRACAUDAL; PERINEURAL at 02:01

## 2025-01-29 RX ADMIN — TRIAMCINOLONE ACETONIDE 80 MG: 40 INJECTION, SUSPENSION INTRA-ARTICULAR; INTRAMUSCULAR at 02:01

## 2025-01-29 NOTE — SEDATION DOCUMENTATION
Pt arrived to ir suite for right shoulder injection. Pt oriented to unit and staff, Pt safely transferred from stretcher to procedural table. Fall risk reviewed and comfort measures utilized with interventions. Safety strap applied, position pillows to minimize pressure points. Blankets applied. Pt prepped and draped utilizing standard sterile technique. Patient placed on continuous monitoring, as required by sedation policy. Timeouts implemented utilizing standard universal time-out per department and facility policy. RN to remain at bedside with continuous monitoring. Pt resting comfortably. Denies pain/discomfort. Will continue to monitor. See flow sheets for monitoring, medication administration, and updates. patient verbalizes understanding.

## 2025-01-29 NOTE — PROCEDURES
Radiology Post-Procedure Note    Pre Op Diagnosis: Right shoulder pain  Post Op Diagnosis: Same    Procedure: Fluoroscopic guided right glenohumeral joint space steroid injection    Procedure performed by: Nicanor Patel MD    Written Informed Consent Obtained: Yes  Specimen Removed: NO  Estimated Blood Loss: Minimal    Findings:   Access to right glenohumeral joint space with a 20 g needle under fluoroscopic guidance with needle tip position confirmed by contrast injection. 7 mL of 0.25% bupivacaine with 80 mg kenalog injected without complication.     Patient tolerated procedure well.    Nicanor Patel MD  Interventional Radiology  Department of Radiology

## 2025-01-29 NOTE — H&P
Radiology History & Physical      SUBJECTIVE:     Chief Complaint: shoulder pain    History of Present Illness:  Cesar Eubanks is a 84 y.o. male who presents for right shoulder joint steroid injection under fluoroscopic guidance.     Past Medical History:   Diagnosis Date    Arthritis     Hernia 6/2013    Hypertension     Polymyalgia rheumatica      Past Surgical History:   Procedure Laterality Date    APPENDECTOMY      CHOLECYSTECTOMY  10/17/12    COLONOSCOPY      HERNIA REPAIR  07/11/2013    laparoscopic cholecystectomy  10/17/12    PILONIDAL CYST DRAINAGE      TONSILLECTOMY         Home Meds:   Prior to Admission medications    Medication Sig Start Date End Date Taking? Authorizing Provider   allopurinoL (ZYLOPRIM) 300 MG tablet Take 300 mg by mouth once daily.   Yes Provider, Historical   cetirizine (ZYRTEC) 5 MG chewable tablet Take 5 mg by mouth once daily.   Yes Provider, Historical   colchicine (MITIGARE) 0.6 mg Cap TAKE TWO CAPSULES BY MOUTH AT THE ONSET OF GOUT ATTACK THEN TAKE ONE CAPSULE 2 HOURS LATER. FOR CONTINUED GOUT PAIN, TAKE ONE CAPSULE TWICE DAILY AS NEEDED 8/19/24  Yes Alfa Chase MD   cyclobenzaprine (FLEXERIL) 10 MG tablet take ONE-HALF TO 1 TABLET BY MOUTH one AND a half HOURS BEFORE bed AS NEEDED FOR muscle pain 12/6/21  Yes Provider, Historical   diclofenac (VOLTAREN) 75 MG EC tablet TAKE ONE TABLET BY MOUTH TWICE DAILY 11/29/24  Yes Alfa Chase MD   DULoxetine (CYMBALTA) 30 MG capsule Take 30 mg by mouth once daily.   Yes Provider, Historical   fluticasone propionate (FLONASE) 50 mcg/actuation nasal spray 1 spray in each nostril   Yes Provider, Historical   furosemide (LASIX) 20 MG tablet Take 1 tablet (20 mg total) by mouth once daily. 1/30/23  Yes Alfa Chase MD   lisinopriL-hydrochlorothiazide (PRINZIDE,ZESTORETIC) 20-25 mg Tab TAKE ONE TABLET BY MOUTH EVERY DAY 10/4/24  Yes Alfa Chase MD   mupirocin (BACTROBAN) 2 % ointment APPLY TOPICALLY THREE TIMES  DAILY 7/6/23  Yes Alfa Chase MD   NIFEdipine (PROCARDIA-XL) 60 MG (OSM) 24 hr tablet TAKE ONE TABLET BY MOUTH ONCE DAILY 10/11/24  Yes Alfa Chase MD   tamsulosin (FLOMAX) 0.4 mg Cap TAKE ONE CAPSULE BY MOUTH EVERY DAY 4/15/24  Yes Alfa Chase MD   traMADoL (ULTRAM) 50 mg tablet Take 1 tablet (50 mg total) by mouth every 8 (eight) hours as needed for Pain. 1/28/25  Yes Alfa Chase MD   zolpidem (AMBIEN) 10 mg Tab TAKE ONE TABLET BY MOUTH NIGHTLY AS NEEDED 8/12/24  Yes Alfa Chase MD     Anticoagulants/Antiplatelets: no anticoagulation    Allergies:   Review of patient's allergies indicates:   Allergen Reactions    Penicillins              OBJECTIVE:     Vital Signs (Most Recent)  Temp: 97.4 °F (36.3 °C) (01/29/25 1422)  Pulse: 94 (01/29/25 1422)  Resp: 18 (01/29/25 1422)  BP: (!) 191/88 (01/29/25 1422)  SpO2: 100 % (01/29/25 1422)    Physical Exam:  General: no acute distress  Mental Status: alert and oriented to person, place and time  HEENT: normocephalic, atraumatic  Chest: unlabored breathing  Heart: regular heart rate  Abdomen: nondistended  Extremity: moves all extremities    Laboratory  Lab Results   Component Value Date    INR 1.0 07/02/2013       Lab Results   Component Value Date    WBC 7.3 10/04/2024    HGB 14.8 10/04/2024    HCT 44.0 10/04/2024    MCV 97.1 10/04/2024     10/04/2024      Lab Results   Component Value Date    GLU 96 10/04/2024     10/04/2024    K 4.1 10/04/2024     10/04/2024    CO2 27 10/04/2024    BUN 27 (H) 10/04/2024    CREATININE 0.94 10/04/2024    CALCIUM 9.6 10/04/2024    MG 2.7 (H) 10/18/2012    ALT 20 10/04/2024    AST 22 10/04/2024    ALBUMIN 4.1 10/04/2024    BILITOT 1.0 10/04/2024    BILIDIR 0.6 (H) 10/15/2012       ASSESSMENT/PLAN:     Sedation Plan: local anesthesia  Patient will undergo right shoulder joint steroid injection.    Nicanor Patel MD  Interventional Radiology  Department of Radiology

## 2025-03-24 DIAGNOSIS — N40.1 BENIGN PROSTATIC HYPERPLASIA WITH LOWER URINARY TRACT SYMPTOMS: ICD-10-CM

## 2025-03-25 RX ORDER — TAMSULOSIN HYDROCHLORIDE 0.4 MG/1
1 CAPSULE ORAL
Qty: 90 CAPSULE | Refills: 3 | Status: SHIPPED | OUTPATIENT
Start: 2025-03-25